# Patient Record
Sex: FEMALE | Race: WHITE | NOT HISPANIC OR LATINO | Employment: OTHER | URBAN - METROPOLITAN AREA
[De-identification: names, ages, dates, MRNs, and addresses within clinical notes are randomized per-mention and may not be internally consistent; named-entity substitution may affect disease eponyms.]

---

## 2020-07-18 ENCOUNTER — TRANSFERRED RECORDS (OUTPATIENT)
Dept: HEALTH INFORMATION MANAGEMENT | Facility: CLINIC | Age: 80
End: 2020-07-18

## 2020-07-18 LAB — EJECTION FRACTION: 65 %

## 2020-08-06 ENCOUNTER — HOSPITAL ENCOUNTER (OUTPATIENT)
Dept: CARDIAC REHAB | Facility: CLINIC | Age: 80
End: 2020-08-06
Payer: COMMERCIAL

## 2020-08-06 PROCEDURE — 40000575 ZZH STATISTIC OP CARDIAC VISIT #2: Performed by: REHABILITATION PRACTITIONER

## 2020-08-06 PROCEDURE — 93797 PHYS/QHP OP CAR RHAB WO ECG: CPT | Mod: 59 | Performed by: REHABILITATION PRACTITIONER

## 2020-08-06 PROCEDURE — 93798 PHYS/QHP OP CAR RHAB W/ECG: CPT | Performed by: REHABILITATION PRACTITIONER

## 2020-08-06 PROCEDURE — 40000116 ZZH STATISTIC OP CR VISIT: Performed by: REHABILITATION PRACTITIONER

## 2020-08-13 ENCOUNTER — HOSPITAL ENCOUNTER (OUTPATIENT)
Dept: CARDIAC REHAB | Facility: CLINIC | Age: 80
End: 2020-08-13
Attending: INTERNAL MEDICINE
Payer: COMMERCIAL

## 2020-08-13 PROCEDURE — 93798 PHYS/QHP OP CAR RHAB W/ECG: CPT

## 2020-08-13 PROCEDURE — 40000116 ZZH STATISTIC OP CR VISIT

## 2020-08-18 ENCOUNTER — HOSPITAL ENCOUNTER (OUTPATIENT)
Dept: CARDIAC REHAB | Facility: CLINIC | Age: 80
End: 2020-08-18
Attending: INTERNAL MEDICINE
Payer: COMMERCIAL

## 2020-08-18 PROCEDURE — 93798 PHYS/QHP OP CAR RHAB W/ECG: CPT

## 2020-08-18 PROCEDURE — 40000116 ZZH STATISTIC OP CR VISIT

## 2020-08-20 ENCOUNTER — HOSPITAL ENCOUNTER (OUTPATIENT)
Dept: CARDIAC REHAB | Facility: CLINIC | Age: 80
End: 2020-08-20
Attending: INTERNAL MEDICINE
Payer: COMMERCIAL

## 2020-08-20 PROCEDURE — 93798 PHYS/QHP OP CAR RHAB W/ECG: CPT

## 2020-08-20 PROCEDURE — 40000116 ZZH STATISTIC OP CR VISIT

## 2020-08-25 ENCOUNTER — HOSPITAL ENCOUNTER (OUTPATIENT)
Dept: CARDIAC REHAB | Facility: CLINIC | Age: 80
End: 2020-08-25
Attending: INTERNAL MEDICINE
Payer: COMMERCIAL

## 2020-08-25 PROCEDURE — 93798 PHYS/QHP OP CAR RHAB W/ECG: CPT

## 2020-08-25 PROCEDURE — 40000116 ZZH STATISTIC OP CR VISIT

## 2020-08-27 ENCOUNTER — HOSPITAL ENCOUNTER (OUTPATIENT)
Dept: CARDIAC REHAB | Facility: CLINIC | Age: 80
End: 2020-08-27
Attending: INTERNAL MEDICINE
Payer: COMMERCIAL

## 2020-08-27 PROCEDURE — 93798 PHYS/QHP OP CAR RHAB W/ECG: CPT

## 2020-08-27 PROCEDURE — 40000116 ZZH STATISTIC OP CR VISIT

## 2020-09-01 ENCOUNTER — HOSPITAL ENCOUNTER (OUTPATIENT)
Dept: CARDIAC REHAB | Facility: CLINIC | Age: 80
End: 2020-09-01
Attending: INTERNAL MEDICINE
Payer: COMMERCIAL

## 2020-09-01 PROCEDURE — 40000116 ZZH STATISTIC OP CR VISIT

## 2020-09-01 PROCEDURE — 93798 PHYS/QHP OP CAR RHAB W/ECG: CPT

## 2020-09-02 ENCOUNTER — HOSPITAL ENCOUNTER (OUTPATIENT)
Dept: CARDIAC REHAB | Facility: CLINIC | Age: 80
End: 2020-09-02
Attending: INTERNAL MEDICINE
Payer: COMMERCIAL

## 2020-09-02 PROCEDURE — 40000575 ZZH STATISTIC OP CARDIAC VISIT #2

## 2020-09-02 PROCEDURE — 93798 PHYS/QHP OP CAR RHAB W/ECG: CPT

## 2020-09-02 PROCEDURE — 93797 PHYS/QHP OP CAR RHAB WO ECG: CPT | Mod: 59

## 2020-09-02 PROCEDURE — 40000116 ZZH STATISTIC OP CR VISIT

## 2020-09-04 ENCOUNTER — HOSPITAL ENCOUNTER (OUTPATIENT)
Dept: CARDIAC REHAB | Facility: CLINIC | Age: 80
End: 2020-09-04
Attending: INTERNAL MEDICINE
Payer: COMMERCIAL

## 2020-09-04 PROCEDURE — 93798 PHYS/QHP OP CAR RHAB W/ECG: CPT

## 2020-09-04 PROCEDURE — 40000116 ZZH STATISTIC OP CR VISIT

## 2020-09-08 ENCOUNTER — HOSPITAL ENCOUNTER (OUTPATIENT)
Dept: CARDIAC REHAB | Facility: CLINIC | Age: 80
End: 2020-09-08
Attending: INTERNAL MEDICINE
Payer: COMMERCIAL

## 2020-09-08 PROCEDURE — 40000116 ZZH STATISTIC OP CR VISIT

## 2020-09-08 PROCEDURE — 93798 PHYS/QHP OP CAR RHAB W/ECG: CPT

## 2020-09-10 ENCOUNTER — HOSPITAL ENCOUNTER (OUTPATIENT)
Dept: CARDIAC REHAB | Facility: CLINIC | Age: 80
End: 2020-09-10
Attending: INTERNAL MEDICINE
Payer: COMMERCIAL

## 2020-09-10 PROCEDURE — 40000116 ZZH STATISTIC OP CR VISIT

## 2020-09-10 PROCEDURE — 93798 PHYS/QHP OP CAR RHAB W/ECG: CPT

## 2020-09-14 ENCOUNTER — HOSPITAL ENCOUNTER (OUTPATIENT)
Dept: CARDIAC REHAB | Facility: CLINIC | Age: 80
End: 2020-09-14
Attending: INTERNAL MEDICINE
Payer: COMMERCIAL

## 2020-09-14 PROCEDURE — 93798 PHYS/QHP OP CAR RHAB W/ECG: CPT

## 2020-09-14 PROCEDURE — 40000116 ZZH STATISTIC OP CR VISIT

## 2020-09-25 ENCOUNTER — HOSPITAL ENCOUNTER (OUTPATIENT)
Dept: CARDIAC REHAB | Facility: CLINIC | Age: 80
End: 2020-09-25
Attending: INTERNAL MEDICINE
Payer: COMMERCIAL

## 2020-09-25 PROCEDURE — 40000116 ZZH STATISTIC OP CR VISIT: Performed by: REHABILITATION PRACTITIONER

## 2020-09-25 PROCEDURE — 93798 PHYS/QHP OP CAR RHAB W/ECG: CPT | Performed by: REHABILITATION PRACTITIONER

## 2020-09-28 ENCOUNTER — HOSPITAL ENCOUNTER (OUTPATIENT)
Dept: CARDIAC REHAB | Facility: CLINIC | Age: 80
End: 2020-09-28
Attending: INTERNAL MEDICINE
Payer: COMMERCIAL

## 2020-09-28 PROCEDURE — 40000116 ZZH STATISTIC OP CR VISIT

## 2020-09-28 PROCEDURE — 93798 PHYS/QHP OP CAR RHAB W/ECG: CPT

## 2020-09-30 ENCOUNTER — HOSPITAL ENCOUNTER (OUTPATIENT)
Dept: CARDIAC REHAB | Facility: CLINIC | Age: 80
End: 2020-09-30
Attending: INTERNAL MEDICINE
Payer: COMMERCIAL

## 2020-09-30 PROCEDURE — 40000116 ZZH STATISTIC OP CR VISIT

## 2020-09-30 PROCEDURE — 93798 PHYS/QHP OP CAR RHAB W/ECG: CPT

## 2020-10-02 ENCOUNTER — HOSPITAL ENCOUNTER (OUTPATIENT)
Dept: CARDIAC REHAB | Facility: CLINIC | Age: 80
End: 2020-10-02
Attending: INTERNAL MEDICINE
Payer: COMMERCIAL

## 2020-10-02 PROCEDURE — 999N000109 HC STATISTIC OP CR VISIT: Performed by: REHABILITATION PRACTITIONER

## 2020-10-02 PROCEDURE — 93798 PHYS/QHP OP CAR RHAB W/ECG: CPT | Performed by: REHABILITATION PRACTITIONER

## 2020-10-05 ENCOUNTER — HOSPITAL ENCOUNTER (OUTPATIENT)
Dept: CARDIAC REHAB | Facility: CLINIC | Age: 80
End: 2020-10-05
Attending: INTERNAL MEDICINE
Payer: COMMERCIAL

## 2020-10-05 PROCEDURE — 93798 PHYS/QHP OP CAR RHAB W/ECG: CPT

## 2020-10-05 PROCEDURE — 999N000109 HC STATISTIC OP CR VISIT

## 2020-10-08 ENCOUNTER — HOSPITAL ENCOUNTER (OUTPATIENT)
Dept: CARDIAC REHAB | Facility: CLINIC | Age: 80
End: 2020-10-08
Attending: INTERNAL MEDICINE
Payer: COMMERCIAL

## 2020-10-08 PROCEDURE — 999N000109 HC STATISTIC OP CR VISIT: Performed by: REHABILITATION PRACTITIONER

## 2020-10-08 PROCEDURE — 93798 PHYS/QHP OP CAR RHAB W/ECG: CPT | Performed by: REHABILITATION PRACTITIONER

## 2020-10-14 ENCOUNTER — HOSPITAL ENCOUNTER (OUTPATIENT)
Dept: CARDIAC REHAB | Facility: CLINIC | Age: 80
End: 2020-10-14
Attending: INTERNAL MEDICINE
Payer: COMMERCIAL

## 2020-10-14 PROCEDURE — 93798 PHYS/QHP OP CAR RHAB W/ECG: CPT

## 2020-10-14 PROCEDURE — 93797 PHYS/QHP OP CAR RHAB WO ECG: CPT

## 2020-10-14 PROCEDURE — 999N000109 HC STATISTIC OP CR VISIT

## 2020-10-14 PROCEDURE — 999N000108 HC STATISTIC OP CARDIAC VISIT #2

## 2021-03-20 ENCOUNTER — HEALTH MAINTENANCE LETTER (OUTPATIENT)
Age: 81
End: 2021-03-20

## 2021-09-04 ENCOUNTER — HEALTH MAINTENANCE LETTER (OUTPATIENT)
Age: 81
End: 2021-09-04

## 2021-11-13 ENCOUNTER — APPOINTMENT (OUTPATIENT)
Dept: GENERAL RADIOLOGY | Facility: CLINIC | Age: 81
DRG: 522 | End: 2021-11-13
Attending: EMERGENCY MEDICINE
Payer: COMMERCIAL

## 2021-11-13 ENCOUNTER — HOSPITAL ENCOUNTER (INPATIENT)
Facility: CLINIC | Age: 81
LOS: 3 days | Discharge: HOME HEALTH | DRG: 522 | End: 2021-11-16
Attending: EMERGENCY MEDICINE | Admitting: INTERNAL MEDICINE
Payer: COMMERCIAL

## 2021-11-13 DIAGNOSIS — S72.001A CLOSED DISPLACED FRACTURE OF RIGHT FEMORAL NECK (H): ICD-10-CM

## 2021-11-13 PROBLEM — E11.9 DM2 (DIABETES MELLITUS, TYPE 2) (H): Status: ACTIVE | Noted: 2020-07-18

## 2021-11-13 PROBLEM — E87.6 HYPOKALEMIA: Status: ACTIVE | Noted: 2020-07-18

## 2021-11-13 PROBLEM — E78.5 HLD (HYPERLIPIDEMIA): Status: ACTIVE | Noted: 2020-07-18

## 2021-11-13 PROBLEM — I25.10 ASCVD (ARTERIOSCLEROTIC CARDIOVASCULAR DISEASE): Status: ACTIVE | Noted: 2020-07-29

## 2021-11-13 PROBLEM — I10 HTN (HYPERTENSION): Status: ACTIVE | Noted: 2020-07-18

## 2021-11-13 PROBLEM — I21.4 NSTEMI (NON-ST ELEVATED MYOCARDIAL INFARCTION) (H): Status: ACTIVE | Noted: 2020-07-18

## 2021-11-13 LAB
ANION GAP SERPL CALCULATED.3IONS-SCNC: 7 MMOL/L (ref 3–14)
BASOPHILS # BLD AUTO: 0.1 10E3/UL (ref 0–0.2)
BASOPHILS NFR BLD AUTO: 0 %
BUN SERPL-MCNC: 16 MG/DL (ref 7–30)
CALCIUM SERPL-MCNC: 8.8 MG/DL (ref 8.5–10.1)
CHLORIDE BLD-SCNC: 106 MMOL/L (ref 94–109)
CO2 SERPL-SCNC: 28 MMOL/L (ref 20–32)
CREAT SERPL-MCNC: 0.64 MG/DL (ref 0.52–1.04)
EOSINOPHIL # BLD AUTO: 0.1 10E3/UL (ref 0–0.7)
EOSINOPHIL NFR BLD AUTO: 0 %
ERYTHROCYTE [DISTWIDTH] IN BLOOD BY AUTOMATED COUNT: 12.5 % (ref 10–15)
GFR SERPL CREATININE-BSD FRML MDRD: 84 ML/MIN/1.73M2
GLUCOSE BLD-MCNC: 178 MG/DL (ref 70–99)
GLUCOSE BLDC GLUCOMTR-MCNC: 185 MG/DL (ref 70–99)
HCT VFR BLD AUTO: 42.4 % (ref 35–47)
HGB BLD-MCNC: 13.8 G/DL (ref 11.7–15.7)
IMM GRANULOCYTES # BLD: 0.2 10E3/UL
IMM GRANULOCYTES NFR BLD: 1 %
INR PPP: 0.92 (ref 0.85–1.15)
LYMPHOCYTES # BLD AUTO: 1.2 10E3/UL (ref 0.8–5.3)
LYMPHOCYTES NFR BLD AUTO: 7 %
MAGNESIUM SERPL-MCNC: 1.9 MG/DL (ref 1.6–2.3)
MCH RBC QN AUTO: 31.2 PG (ref 26.5–33)
MCHC RBC AUTO-ENTMCNC: 32.5 G/DL (ref 31.5–36.5)
MCV RBC AUTO: 96 FL (ref 78–100)
MONOCYTES # BLD AUTO: 0.6 10E3/UL (ref 0–1.3)
MONOCYTES NFR BLD AUTO: 4 %
NEUTROPHILS # BLD AUTO: 13.6 10E3/UL (ref 1.6–8.3)
NEUTROPHILS NFR BLD AUTO: 88 %
NRBC # BLD AUTO: 0 10E3/UL
NRBC BLD AUTO-RTO: 0 /100
PLATELET # BLD AUTO: 243 10E3/UL (ref 150–450)
POTASSIUM BLD-SCNC: 3.3 MMOL/L (ref 3.4–5.3)
RBC # BLD AUTO: 4.43 10E6/UL (ref 3.8–5.2)
SARS-COV-2 RNA RESP QL NAA+PROBE: NEGATIVE
SODIUM SERPL-SCNC: 141 MMOL/L (ref 133–144)
WBC # BLD AUTO: 15.7 10E3/UL (ref 4–11)

## 2021-11-13 PROCEDURE — 99285 EMERGENCY DEPT VISIT HI MDM: CPT | Mod: 25

## 2021-11-13 PROCEDURE — 83036 HEMOGLOBIN GLYCOSYLATED A1C: CPT | Performed by: INTERNAL MEDICINE

## 2021-11-13 PROCEDURE — 83735 ASSAY OF MAGNESIUM: CPT | Performed by: INTERNAL MEDICINE

## 2021-11-13 PROCEDURE — 71045 X-RAY EXAM CHEST 1 VIEW: CPT

## 2021-11-13 PROCEDURE — 87635 SARS-COV-2 COVID-19 AMP PRB: CPT | Performed by: EMERGENCY MEDICINE

## 2021-11-13 PROCEDURE — 36415 COLL VENOUS BLD VENIPUNCTURE: CPT | Performed by: EMERGENCY MEDICINE

## 2021-11-13 PROCEDURE — 99223 1ST HOSP IP/OBS HIGH 75: CPT | Mod: AI | Performed by: INTERNAL MEDICINE

## 2021-11-13 PROCEDURE — 93005 ELECTROCARDIOGRAM TRACING: CPT

## 2021-11-13 PROCEDURE — 85004 AUTOMATED DIFF WBC COUNT: CPT | Performed by: EMERGENCY MEDICINE

## 2021-11-13 PROCEDURE — 85610 PROTHROMBIN TIME: CPT | Performed by: EMERGENCY MEDICINE

## 2021-11-13 PROCEDURE — C9803 HOPD COVID-19 SPEC COLLECT: HCPCS

## 2021-11-13 PROCEDURE — 73502 X-RAY EXAM HIP UNI 2-3 VIEWS: CPT

## 2021-11-13 PROCEDURE — 80048 BASIC METABOLIC PNL TOTAL CA: CPT | Performed by: EMERGENCY MEDICINE

## 2021-11-13 PROCEDURE — 250N000013 HC RX MED GY IP 250 OP 250 PS 637: Performed by: EMERGENCY MEDICINE

## 2021-11-13 PROCEDURE — 120N000001 HC R&B MED SURG/OB

## 2021-11-13 RX ORDER — AMOXICILLIN 250 MG
1 CAPSULE ORAL 2 TIMES DAILY PRN
Status: DISCONTINUED | OUTPATIENT
Start: 2021-11-13 | End: 2021-11-16 | Stop reason: HOSPADM

## 2021-11-13 RX ORDER — POTASSIUM CHLORIDE 1.5 G/1.58G
20 POWDER, FOR SOLUTION ORAL ONCE
Status: COMPLETED | OUTPATIENT
Start: 2021-11-13 | End: 2021-11-13

## 2021-11-13 RX ORDER — NALOXONE HYDROCHLORIDE 0.4 MG/ML
0.2 INJECTION, SOLUTION INTRAMUSCULAR; INTRAVENOUS; SUBCUTANEOUS
Status: DISCONTINUED | OUTPATIENT
Start: 2021-11-13 | End: 2021-11-16 | Stop reason: HOSPADM

## 2021-11-13 RX ORDER — PROCHLORPERAZINE 25 MG
12.5 SUPPOSITORY, RECTAL RECTAL EVERY 12 HOURS PRN
Status: DISCONTINUED | OUTPATIENT
Start: 2021-11-13 | End: 2021-11-16 | Stop reason: HOSPADM

## 2021-11-13 RX ORDER — ATORVASTATIN CALCIUM 40 MG/1
40 TABLET, FILM COATED ORAL DAILY
COMMUNITY

## 2021-11-13 RX ORDER — HYDROMORPHONE HCL IN WATER/PF 6 MG/30 ML
0.2 PATIENT CONTROLLED ANALGESIA SYRINGE INTRAVENOUS
Status: DISCONTINUED | OUTPATIENT
Start: 2021-11-13 | End: 2021-11-16 | Stop reason: HOSPADM

## 2021-11-13 RX ORDER — ONDANSETRON 4 MG/1
4 TABLET, ORALLY DISINTEGRATING ORAL EVERY 6 HOURS PRN
Status: DISCONTINUED | OUTPATIENT
Start: 2021-11-13 | End: 2021-11-16 | Stop reason: HOSPADM

## 2021-11-13 RX ORDER — AMOXICILLIN 250 MG
2 CAPSULE ORAL 2 TIMES DAILY PRN
Status: DISCONTINUED | OUTPATIENT
Start: 2021-11-13 | End: 2021-11-16 | Stop reason: HOSPADM

## 2021-11-13 RX ORDER — CHLORTHALIDONE 25 MG/1
12.5 TABLET ORAL DAILY
COMMUNITY

## 2021-11-13 RX ORDER — ACETAMINOPHEN 325 MG/1
650 TABLET ORAL EVERY 6 HOURS PRN
Status: DISCONTINUED | OUTPATIENT
Start: 2021-11-13 | End: 2021-11-16 | Stop reason: HOSPADM

## 2021-11-13 RX ORDER — NALOXONE HYDROCHLORIDE 0.4 MG/ML
0.4 INJECTION, SOLUTION INTRAMUSCULAR; INTRAVENOUS; SUBCUTANEOUS
Status: DISCONTINUED | OUTPATIENT
Start: 2021-11-13 | End: 2021-11-16 | Stop reason: HOSPADM

## 2021-11-13 RX ORDER — ACETAMINOPHEN 650 MG/1
650 SUPPOSITORY RECTAL EVERY 6 HOURS PRN
Status: DISCONTINUED | OUTPATIENT
Start: 2021-11-13 | End: 2021-11-16 | Stop reason: HOSPADM

## 2021-11-13 RX ORDER — DEXTROSE MONOHYDRATE 25 G/50ML
25-50 INJECTION, SOLUTION INTRAVENOUS
Status: DISCONTINUED | OUTPATIENT
Start: 2021-11-13 | End: 2021-11-16 | Stop reason: HOSPADM

## 2021-11-13 RX ORDER — ONDANSETRON 2 MG/ML
4 INJECTION INTRAMUSCULAR; INTRAVENOUS EVERY 6 HOURS PRN
Status: DISCONTINUED | OUTPATIENT
Start: 2021-11-13 | End: 2021-11-16 | Stop reason: HOSPADM

## 2021-11-13 RX ORDER — LIDOCAINE 40 MG/G
CREAM TOPICAL
Status: DISCONTINUED | OUTPATIENT
Start: 2021-11-13 | End: 2021-11-15

## 2021-11-13 RX ORDER — NICOTINE POLACRILEX 4 MG
15-30 LOZENGE BUCCAL
Status: DISCONTINUED | OUTPATIENT
Start: 2021-11-13 | End: 2021-11-16 | Stop reason: HOSPADM

## 2021-11-13 RX ORDER — OXYCODONE HYDROCHLORIDE 5 MG/1
5 TABLET ORAL EVERY 4 HOURS PRN
Status: DISCONTINUED | OUTPATIENT
Start: 2021-11-13 | End: 2021-11-16 | Stop reason: HOSPADM

## 2021-11-13 RX ORDER — NITROGLYCERIN 0.4 MG/1
0.4 TABLET SUBLINGUAL EVERY 5 MIN PRN
COMMUNITY

## 2021-11-13 RX ORDER — PROCHLORPERAZINE MALEATE 5 MG
5 TABLET ORAL EVERY 6 HOURS PRN
Status: DISCONTINUED | OUTPATIENT
Start: 2021-11-13 | End: 2021-11-16 | Stop reason: HOSPADM

## 2021-11-13 RX ORDER — ATENOLOL 50 MG/1
50 TABLET ORAL DAILY
COMMUNITY

## 2021-11-13 RX ADMIN — POTASSIUM CHLORIDE 20 MEQ: 1.5 POWDER, FOR SOLUTION ORAL at 21:31

## 2021-11-13 ASSESSMENT — ACTIVITIES OF DAILY LIVING (ADL)
ADLS_ACUITY_SCORE: 3
ADLS_ACUITY_SCORE: 3

## 2021-11-13 ASSESSMENT — ENCOUNTER SYMPTOMS
ARTHRALGIAS: 1
BACK PAIN: 0
SHORTNESS OF BREATH: 0
NUMBNESS: 0
ABDOMINAL PAIN: 0

## 2021-11-14 ENCOUNTER — ANESTHESIA (OUTPATIENT)
Dept: SURGERY | Facility: CLINIC | Age: 81
DRG: 522 | End: 2021-11-14
Payer: COMMERCIAL

## 2021-11-14 ENCOUNTER — APPOINTMENT (OUTPATIENT)
Dept: GENERAL RADIOLOGY | Facility: CLINIC | Age: 81
DRG: 522 | End: 2021-11-14
Attending: PHYSICIAN ASSISTANT
Payer: COMMERCIAL

## 2021-11-14 ENCOUNTER — ANESTHESIA EVENT (OUTPATIENT)
Dept: SURGERY | Facility: CLINIC | Age: 81
DRG: 522 | End: 2021-11-14
Payer: COMMERCIAL

## 2021-11-14 LAB
ANION GAP SERPL CALCULATED.3IONS-SCNC: 5 MMOL/L (ref 3–14)
BASOPHILS # BLD AUTO: 0 10E3/UL (ref 0–0.2)
BASOPHILS NFR BLD AUTO: 0 %
BUN SERPL-MCNC: 15 MG/DL (ref 7–30)
CALCIUM SERPL-MCNC: 8.8 MG/DL (ref 8.5–10.1)
CHLORIDE BLD-SCNC: 105 MMOL/L (ref 94–109)
CO2 SERPL-SCNC: 28 MMOL/L (ref 20–32)
CREAT SERPL-MCNC: 0.61 MG/DL (ref 0.52–1.04)
EOSINOPHIL # BLD AUTO: 0 10E3/UL (ref 0–0.7)
EOSINOPHIL NFR BLD AUTO: 0 %
ERYTHROCYTE [DISTWIDTH] IN BLOOD BY AUTOMATED COUNT: 12.8 % (ref 10–15)
GFR SERPL CREATININE-BSD FRML MDRD: 85 ML/MIN/1.73M2
GLUCOSE BLD-MCNC: 177 MG/DL (ref 70–99)
GLUCOSE BLDC GLUCOMTR-MCNC: 156 MG/DL (ref 70–99)
GLUCOSE BLDC GLUCOMTR-MCNC: 162 MG/DL (ref 70–99)
GLUCOSE BLDC GLUCOMTR-MCNC: 208 MG/DL (ref 70–99)
GLUCOSE BLDC GLUCOMTR-MCNC: 227 MG/DL (ref 70–99)
GLUCOSE BLDC GLUCOMTR-MCNC: 242 MG/DL (ref 70–99)
HBA1C MFR BLD: 6.8 % (ref 0–5.6)
HCT VFR BLD AUTO: 41.3 % (ref 35–47)
HGB BLD-MCNC: 13.4 G/DL (ref 11.7–15.7)
IMM GRANULOCYTES # BLD: 0.1 10E3/UL
IMM GRANULOCYTES NFR BLD: 1 %
LYMPHOCYTES # BLD AUTO: 1.2 10E3/UL (ref 0.8–5.3)
LYMPHOCYTES NFR BLD AUTO: 13 %
MCH RBC QN AUTO: 31 PG (ref 26.5–33)
MCHC RBC AUTO-ENTMCNC: 32.4 G/DL (ref 31.5–36.5)
MCV RBC AUTO: 96 FL (ref 78–100)
MONOCYTES # BLD AUTO: 0.6 10E3/UL (ref 0–1.3)
MONOCYTES NFR BLD AUTO: 6 %
NEUTROPHILS # BLD AUTO: 7.4 10E3/UL (ref 1.6–8.3)
NEUTROPHILS NFR BLD AUTO: 80 %
NRBC # BLD AUTO: 0 10E3/UL
NRBC BLD AUTO-RTO: 0 /100
PLATELET # BLD AUTO: 230 10E3/UL (ref 150–450)
POTASSIUM BLD-SCNC: 3.2 MMOL/L (ref 3.4–5.3)
POTASSIUM BLD-SCNC: 3.5 MMOL/L (ref 3.4–5.3)
POTASSIUM BLD-SCNC: 4.3 MMOL/L (ref 3.4–5.3)
RBC # BLD AUTO: 4.32 10E6/UL (ref 3.8–5.2)
SODIUM SERPL-SCNC: 138 MMOL/L (ref 133–144)
WBC # BLD AUTO: 9.4 10E3/UL (ref 4–11)

## 2021-11-14 PROCEDURE — 258N000003 HC RX IP 258 OP 636: Performed by: INTERNAL MEDICINE

## 2021-11-14 PROCEDURE — C1776 JOINT DEVICE (IMPLANTABLE): HCPCS | Performed by: ORTHOPAEDIC SURGERY

## 2021-11-14 PROCEDURE — 272N000001 HC OR GENERAL SUPPLY STERILE: Performed by: ORTHOPAEDIC SURGERY

## 2021-11-14 PROCEDURE — 250N000011 HC RX IP 250 OP 636: Performed by: PHYSICIAN ASSISTANT

## 2021-11-14 PROCEDURE — 36415 COLL VENOUS BLD VENIPUNCTURE: CPT | Performed by: ANESTHESIOLOGY

## 2021-11-14 PROCEDURE — 88311 DECALCIFY TISSUE: CPT | Mod: TC | Performed by: ORTHOPAEDIC SURGERY

## 2021-11-14 PROCEDURE — 0SR904A REPLACEMENT OF RIGHT HIP JOINT WITH CERAMIC ON POLYETHYLENE SYNTHETIC SUBSTITUTE, UNCEMENTED, OPEN APPROACH: ICD-10-PCS | Performed by: ORTHOPAEDIC SURGERY

## 2021-11-14 PROCEDURE — 250N000011 HC RX IP 250 OP 636: Performed by: NURSE ANESTHETIST, CERTIFIED REGISTERED

## 2021-11-14 PROCEDURE — 80048 BASIC METABOLIC PNL TOTAL CA: CPT | Performed by: INTERNAL MEDICINE

## 2021-11-14 PROCEDURE — 84132 ASSAY OF SERUM POTASSIUM: CPT | Performed by: INTERNAL MEDICINE

## 2021-11-14 PROCEDURE — C1713 ANCHOR/SCREW BN/BN,TIS/BN: HCPCS | Performed by: ORTHOPAEDIC SURGERY

## 2021-11-14 PROCEDURE — 250N000009 HC RX 250: Performed by: ANESTHESIOLOGY

## 2021-11-14 PROCEDURE — 999N000141 HC STATISTIC PRE-PROCEDURE NURSING ASSESSMENT: Performed by: ORTHOPAEDIC SURGERY

## 2021-11-14 PROCEDURE — 258N000001 HC RX 258: Performed by: ORTHOPAEDIC SURGERY

## 2021-11-14 PROCEDURE — 250N000011 HC RX IP 250 OP 636: Performed by: INTERNAL MEDICINE

## 2021-11-14 PROCEDURE — 250N000011 HC RX IP 250 OP 636: Performed by: ORTHOPAEDIC SURGERY

## 2021-11-14 PROCEDURE — 250N000009 HC RX 250: Performed by: NURSE ANESTHETIST, CERTIFIED REGISTERED

## 2021-11-14 PROCEDURE — 999N000065 XR PELVIS AD HIP PORTABLE RIGHT 1 VIEW

## 2021-11-14 PROCEDURE — 250N000013 HC RX MED GY IP 250 OP 250 PS 637: Performed by: INTERNAL MEDICINE

## 2021-11-14 PROCEDURE — 250N000009 HC RX 250: Performed by: ORTHOPAEDIC SURGERY

## 2021-11-14 PROCEDURE — 250N000012 HC RX MED GY IP 250 OP 636 PS 637: Performed by: INTERNAL MEDICINE

## 2021-11-14 PROCEDURE — 710N000010 HC RECOVERY PHASE 1, LEVEL 2, PER MIN: Performed by: ORTHOPAEDIC SURGERY

## 2021-11-14 PROCEDURE — 370N000017 HC ANESTHESIA TECHNICAL FEE, PER MIN: Performed by: ORTHOPAEDIC SURGERY

## 2021-11-14 PROCEDURE — 36415 COLL VENOUS BLD VENIPUNCTURE: CPT | Performed by: INTERNAL MEDICINE

## 2021-11-14 PROCEDURE — 360N000077 HC SURGERY LEVEL 4, PER MIN: Performed by: ORTHOPAEDIC SURGERY

## 2021-11-14 PROCEDURE — 84132 ASSAY OF SERUM POTASSIUM: CPT | Performed by: ANESTHESIOLOGY

## 2021-11-14 PROCEDURE — 250N000011 HC RX IP 250 OP 636: Performed by: ANESTHESIOLOGY

## 2021-11-14 PROCEDURE — 120N000001 HC R&B MED SURG/OB

## 2021-11-14 PROCEDURE — 258N000003 HC RX IP 258 OP 636: Performed by: PHYSICIAN ASSISTANT

## 2021-11-14 PROCEDURE — 85048 AUTOMATED LEUKOCYTE COUNT: CPT | Performed by: INTERNAL MEDICINE

## 2021-11-14 PROCEDURE — 258N000003 HC RX IP 258 OP 636: Performed by: NURSE ANESTHETIST, CERTIFIED REGISTERED

## 2021-11-14 PROCEDURE — 99232 SBSQ HOSP IP/OBS MODERATE 35: CPT | Performed by: INTERNAL MEDICINE

## 2021-11-14 DEVICE — IMP STEM FEMORAL HIP STRK ACCOLADE II 132DEG SZ 6 6720-0635: Type: IMPLANTABLE DEVICE | Site: HIP | Status: FUNCTIONAL

## 2021-11-14 DEVICE — IMP SCR STRK LOW PROFILE HEX 6.5X30MM 7030-6530: Type: IMPLANTABLE DEVICE | Site: HIP | Status: FUNCTIONAL

## 2021-11-14 DEVICE — IMP HEAD FEMORAL STRK BIOLOX DELTA CERAMIC V40 36MM: Type: IMPLANTABLE DEVICE | Site: HIP | Status: FUNCTIONAL

## 2021-11-14 DEVICE — 6.5MM LOW PROFILE HEX SCR 20MM: Type: IMPLANTABLE DEVICE | Site: HIP | Status: FUNCTIONAL

## 2021-11-14 DEVICE — IMPLANTABLE DEVICE: Type: IMPLANTABLE DEVICE | Site: HIP | Status: FUNCTIONAL

## 2021-11-14 RX ORDER — DIPHENHYDRAMINE HYDROCHLORIDE 50 MG/ML
12.5 INJECTION INTRAMUSCULAR; INTRAVENOUS EVERY 6 HOURS PRN
Status: DISCONTINUED | OUTPATIENT
Start: 2021-11-14 | End: 2021-11-14 | Stop reason: HOSPADM

## 2021-11-14 RX ORDER — LIDOCAINE HYDROCHLORIDE 10 MG/ML
INJECTION, SOLUTION INFILTRATION; PERINEURAL PRN
Status: DISCONTINUED | OUTPATIENT
Start: 2021-11-14 | End: 2021-11-14

## 2021-11-14 RX ORDER — ONDANSETRON 4 MG/1
4 TABLET, ORALLY DISINTEGRATING ORAL EVERY 30 MIN PRN
Status: DISCONTINUED | OUTPATIENT
Start: 2021-11-14 | End: 2021-11-14 | Stop reason: HOSPADM

## 2021-11-14 RX ORDER — LISINOPRIL 10 MG/1
10 TABLET ORAL DAILY
Status: DISCONTINUED | OUTPATIENT
Start: 2021-11-14 | End: 2021-11-16 | Stop reason: HOSPADM

## 2021-11-14 RX ORDER — TRANEXAMIC ACID 10 MG/ML
1 INJECTION, SOLUTION INTRAVENOUS ONCE
Status: COMPLETED | OUTPATIENT
Start: 2021-11-14 | End: 2021-11-14

## 2021-11-14 RX ORDER — TRANEXAMIC ACID 10 MG/ML
1 INJECTION, SOLUTION INTRAVENOUS ONCE
Status: DISCONTINUED | OUTPATIENT
Start: 2021-11-14 | End: 2021-11-14

## 2021-11-14 RX ORDER — SCOLOPAMINE TRANSDERMAL SYSTEM 1 MG/1
1 PATCH, EXTENDED RELEASE TRANSDERMAL ONCE
Status: DISCONTINUED | OUTPATIENT
Start: 2021-11-14 | End: 2021-11-16 | Stop reason: HOSPADM

## 2021-11-14 RX ORDER — ACETAMINOPHEN 325 MG/1
975 TABLET ORAL ONCE
Status: DISCONTINUED | OUTPATIENT
Start: 2021-11-14 | End: 2021-11-14 | Stop reason: HOSPADM

## 2021-11-14 RX ORDER — PHENYLEPHRINE HYDROCHLORIDE 10 MG/ML
INJECTION INTRAVENOUS PRN
Status: DISCONTINUED | OUTPATIENT
Start: 2021-11-14 | End: 2021-11-14

## 2021-11-14 RX ORDER — LIDOCAINE 40 MG/G
CREAM TOPICAL
Status: DISCONTINUED | OUTPATIENT
Start: 2021-11-14 | End: 2021-11-14 | Stop reason: HOSPADM

## 2021-11-14 RX ORDER — ATORVASTATIN CALCIUM 40 MG/1
40 TABLET, FILM COATED ORAL DAILY
Status: DISCONTINUED | OUTPATIENT
Start: 2021-11-14 | End: 2021-11-16 | Stop reason: HOSPADM

## 2021-11-14 RX ORDER — HYDRALAZINE HYDROCHLORIDE 20 MG/ML
2.5-5 INJECTION INTRAMUSCULAR; INTRAVENOUS EVERY 10 MIN PRN
Status: DISCONTINUED | OUTPATIENT
Start: 2021-11-14 | End: 2021-11-14 | Stop reason: HOSPADM

## 2021-11-14 RX ORDER — FENTANYL CITRATE 50 UG/ML
25 INJECTION, SOLUTION INTRAMUSCULAR; INTRAVENOUS EVERY 5 MIN PRN
Status: DISCONTINUED | OUTPATIENT
Start: 2021-11-14 | End: 2021-11-14 | Stop reason: HOSPADM

## 2021-11-14 RX ORDER — OXYCODONE HYDROCHLORIDE 5 MG/1
5 TABLET ORAL EVERY 4 HOURS PRN
Status: DISCONTINUED | OUTPATIENT
Start: 2021-11-14 | End: 2021-11-14 | Stop reason: HOSPADM

## 2021-11-14 RX ORDER — SODIUM CHLORIDE, SODIUM LACTATE, POTASSIUM CHLORIDE, CALCIUM CHLORIDE 600; 310; 30; 20 MG/100ML; MG/100ML; MG/100ML; MG/100ML
INJECTION, SOLUTION INTRAVENOUS CONTINUOUS
Status: DISCONTINUED | OUTPATIENT
Start: 2021-11-14 | End: 2021-11-14 | Stop reason: HOSPADM

## 2021-11-14 RX ORDER — HALOPERIDOL 5 MG/ML
1 INJECTION INTRAMUSCULAR
Status: DISCONTINUED | OUTPATIENT
Start: 2021-11-14 | End: 2021-11-14 | Stop reason: HOSPADM

## 2021-11-14 RX ORDER — HYDROMORPHONE HCL IN WATER/PF 6 MG/30 ML
0.2 PATIENT CONTROLLED ANALGESIA SYRINGE INTRAVENOUS EVERY 5 MIN PRN
Status: DISCONTINUED | OUTPATIENT
Start: 2021-11-14 | End: 2021-11-14 | Stop reason: HOSPADM

## 2021-11-14 RX ORDER — LIDOCAINE 40 MG/G
CREAM TOPICAL
Status: DISCONTINUED | OUTPATIENT
Start: 2021-11-14 | End: 2021-11-14

## 2021-11-14 RX ORDER — VANCOMYCIN HYDROCHLORIDE 1 G/20ML
INJECTION, POWDER, LYOPHILIZED, FOR SOLUTION INTRAVENOUS PRN
Status: DISCONTINUED | OUTPATIENT
Start: 2021-11-14 | End: 2021-11-14 | Stop reason: HOSPADM

## 2021-11-14 RX ORDER — KETOROLAC TROMETHAMINE 30 MG/ML
INJECTION, SOLUTION INTRAMUSCULAR; INTRAVENOUS PRN
Status: DISCONTINUED | OUTPATIENT
Start: 2021-11-14 | End: 2021-11-14

## 2021-11-14 RX ORDER — PROPOFOL 10 MG/ML
INJECTION, EMULSION INTRAVENOUS PRN
Status: DISCONTINUED | OUTPATIENT
Start: 2021-11-14 | End: 2021-11-14

## 2021-11-14 RX ORDER — DIAZEPAM 10 MG/2ML
2.5 INJECTION, SOLUTION INTRAMUSCULAR; INTRAVENOUS
Status: DISCONTINUED | OUTPATIENT
Start: 2021-11-14 | End: 2021-11-14 | Stop reason: HOSPADM

## 2021-11-14 RX ORDER — CEFAZOLIN SODIUM/WATER 2 G/20 ML
2 SYRINGE (ML) INTRAVENOUS SEE ADMIN INSTRUCTIONS
Status: DISCONTINUED | OUTPATIENT
Start: 2021-11-14 | End: 2021-11-14 | Stop reason: HOSPADM

## 2021-11-14 RX ORDER — ONDANSETRON 2 MG/ML
INJECTION INTRAMUSCULAR; INTRAVENOUS PRN
Status: DISCONTINUED | OUTPATIENT
Start: 2021-11-14 | End: 2021-11-14

## 2021-11-14 RX ORDER — POTASSIUM CHLORIDE 7.45 MG/ML
10 INJECTION INTRAVENOUS
Status: COMPLETED | OUTPATIENT
Start: 2021-11-14 | End: 2021-11-14

## 2021-11-14 RX ORDER — LIDOCAINE 40 MG/G
CREAM TOPICAL
Status: DISCONTINUED | OUTPATIENT
Start: 2021-11-14 | End: 2021-11-16 | Stop reason: HOSPADM

## 2021-11-14 RX ORDER — CEFAZOLIN SODIUM/WATER 2 G/20 ML
2 SYRINGE (ML) INTRAVENOUS
Status: DISCONTINUED | OUTPATIENT
Start: 2021-11-14 | End: 2021-11-14 | Stop reason: HOSPADM

## 2021-11-14 RX ORDER — DEXAMETHASONE SODIUM PHOSPHATE 4 MG/ML
INJECTION, SOLUTION INTRA-ARTICULAR; INTRALESIONAL; INTRAMUSCULAR; INTRAVENOUS; SOFT TISSUE PRN
Status: DISCONTINUED | OUTPATIENT
Start: 2021-11-14 | End: 2021-11-14

## 2021-11-14 RX ORDER — DIMENHYDRINATE 50 MG/ML
25 INJECTION, SOLUTION INTRAMUSCULAR; INTRAVENOUS
Status: DISCONTINUED | OUTPATIENT
Start: 2021-11-14 | End: 2021-11-14 | Stop reason: HOSPADM

## 2021-11-14 RX ORDER — CEFAZOLIN SODIUM 1 G/50ML
1 INJECTION, SOLUTION INTRAVENOUS EVERY 8 HOURS
Status: COMPLETED | OUTPATIENT
Start: 2021-11-15 | End: 2021-11-15

## 2021-11-14 RX ORDER — TRANEXAMIC ACID 10 MG/ML
1 INJECTION, SOLUTION INTRAVENOUS ONCE
Status: DISCONTINUED | OUTPATIENT
Start: 2021-11-14 | End: 2021-11-14 | Stop reason: HOSPADM

## 2021-11-14 RX ORDER — FENTANYL CITRATE 50 UG/ML
INJECTION, SOLUTION INTRAMUSCULAR; INTRAVENOUS PRN
Status: DISCONTINUED | OUTPATIENT
Start: 2021-11-14 | End: 2021-11-14

## 2021-11-14 RX ORDER — MEPERIDINE HYDROCHLORIDE 25 MG/ML
12.5 INJECTION INTRAMUSCULAR; INTRAVENOUS; SUBCUTANEOUS EVERY 5 MIN PRN
Status: DISCONTINUED | OUTPATIENT
Start: 2021-11-14 | End: 2021-11-14 | Stop reason: HOSPADM

## 2021-11-14 RX ORDER — ONDANSETRON 2 MG/ML
4 INJECTION INTRAMUSCULAR; INTRAVENOUS EVERY 30 MIN PRN
Status: DISCONTINUED | OUTPATIENT
Start: 2021-11-14 | End: 2021-11-14 | Stop reason: HOSPADM

## 2021-11-14 RX ORDER — NEOSTIGMINE METHYLSULFATE 1 MG/ML
VIAL (ML) INJECTION PRN
Status: DISCONTINUED | OUTPATIENT
Start: 2021-11-14 | End: 2021-11-14

## 2021-11-14 RX ORDER — LABETALOL HYDROCHLORIDE 5 MG/ML
10 INJECTION, SOLUTION INTRAVENOUS
Status: DISCONTINUED | OUTPATIENT
Start: 2021-11-14 | End: 2021-11-14 | Stop reason: HOSPADM

## 2021-11-14 RX ORDER — ALBUTEROL SULFATE 0.83 MG/ML
2.5 SOLUTION RESPIRATORY (INHALATION) EVERY 4 HOURS PRN
Status: DISCONTINUED | OUTPATIENT
Start: 2021-11-14 | End: 2021-11-14 | Stop reason: HOSPADM

## 2021-11-14 RX ORDER — SODIUM CHLORIDE, SODIUM LACTATE, POTASSIUM CHLORIDE, CALCIUM CHLORIDE 600; 310; 30; 20 MG/100ML; MG/100ML; MG/100ML; MG/100ML
INJECTION, SOLUTION INTRAVENOUS CONTINUOUS PRN
Status: DISCONTINUED | OUTPATIENT
Start: 2021-11-14 | End: 2021-11-14

## 2021-11-14 RX ORDER — EPHEDRINE SULFATE 50 MG/ML
INJECTION, SOLUTION INTRAVENOUS PRN
Status: DISCONTINUED | OUTPATIENT
Start: 2021-11-14 | End: 2021-11-14

## 2021-11-14 RX ORDER — LANOLIN ALCOHOL/MO/W.PET/CERES
3 CREAM (GRAM) TOPICAL
Status: DISCONTINUED | OUTPATIENT
Start: 2021-11-14 | End: 2021-11-16 | Stop reason: HOSPADM

## 2021-11-14 RX ORDER — SODIUM CHLORIDE, SODIUM LACTATE, POTASSIUM CHLORIDE, CALCIUM CHLORIDE 600; 310; 30; 20 MG/100ML; MG/100ML; MG/100ML; MG/100ML
INJECTION, SOLUTION INTRAVENOUS CONTINUOUS
Status: DISCONTINUED | OUTPATIENT
Start: 2021-11-14 | End: 2021-11-15

## 2021-11-14 RX ORDER — TRANEXAMIC ACID 650 MG/1
1950 TABLET ORAL ONCE
Status: DISCONTINUED | OUTPATIENT
Start: 2021-11-14 | End: 2021-11-14

## 2021-11-14 RX ORDER — GLYCOPYRROLATE 0.2 MG/ML
INJECTION, SOLUTION INTRAMUSCULAR; INTRAVENOUS PRN
Status: DISCONTINUED | OUTPATIENT
Start: 2021-11-14 | End: 2021-11-14

## 2021-11-14 RX ORDER — ATENOLOL 50 MG/1
50 TABLET ORAL DAILY
Status: DISCONTINUED | OUTPATIENT
Start: 2021-11-14 | End: 2021-11-16 | Stop reason: HOSPADM

## 2021-11-14 RX ORDER — DIPHENHYDRAMINE HCL 12.5MG/5ML
12.5 LIQUID (ML) ORAL EVERY 6 HOURS PRN
Status: DISCONTINUED | OUTPATIENT
Start: 2021-11-14 | End: 2021-11-14 | Stop reason: HOSPADM

## 2021-11-14 RX ADMIN — HYDROMORPHONE HYDROCHLORIDE 1 MG: 1 INJECTION, SOLUTION INTRAMUSCULAR; INTRAVENOUS; SUBCUTANEOUS at 16:30

## 2021-11-14 RX ADMIN — SODIUM CHLORIDE, POTASSIUM CHLORIDE, SODIUM LACTATE AND CALCIUM CHLORIDE: 600; 310; 30; 20 INJECTION, SOLUTION INTRAVENOUS at 21:33

## 2021-11-14 RX ADMIN — SODIUM CHLORIDE, POTASSIUM CHLORIDE, SODIUM LACTATE AND CALCIUM CHLORIDE: 600; 310; 30; 20 INJECTION, SOLUTION INTRAVENOUS at 15:52

## 2021-11-14 RX ADMIN — FENTANYL CITRATE 25 MCG: 50 INJECTION INTRAMUSCULAR; INTRAVENOUS at 19:05

## 2021-11-14 RX ADMIN — NEOSTIGMINE METHYLSULFATE 2 MG: 1 INJECTION, SOLUTION INTRAVENOUS at 18:31

## 2021-11-14 RX ADMIN — HYDROMORPHONE HYDROCHLORIDE 0.2 MG: 0.2 INJECTION, SOLUTION INTRAMUSCULAR; INTRAVENOUS; SUBCUTANEOUS at 09:02

## 2021-11-14 RX ADMIN — ONDANSETRON HYDROCHLORIDE 4 MG: 2 INJECTION, SOLUTION INTRAVENOUS at 16:30

## 2021-11-14 RX ADMIN — GLYCOPYRROLATE 0.2 MG: 0.2 INJECTION, SOLUTION INTRAMUSCULAR; INTRAVENOUS at 16:30

## 2021-11-14 RX ADMIN — DEXAMETHASONE SODIUM PHOSPHATE 4 MG: 4 INJECTION, SOLUTION INTRA-ARTICULAR; INTRALESIONAL; INTRAMUSCULAR; INTRAVENOUS; SOFT TISSUE at 16:30

## 2021-11-14 RX ADMIN — SCOPALAMINE 1 PATCH: 1 PATCH, EXTENDED RELEASE TRANSDERMAL at 16:00

## 2021-11-14 RX ADMIN — FENTANYL CITRATE 25 MCG: 50 INJECTION INTRAMUSCULAR; INTRAVENOUS at 19:30

## 2021-11-14 RX ADMIN — EPHEDRINE SULFATE 5 MG: 50 INJECTION, SOLUTION INTRAVENOUS at 16:43

## 2021-11-14 RX ADMIN — HYDROMORPHONE HYDROCHLORIDE 0.2 MG: 0.2 INJECTION, SOLUTION INTRAMUSCULAR; INTRAVENOUS; SUBCUTANEOUS at 05:59

## 2021-11-14 RX ADMIN — TRANEXAMIC ACID 1 G: 10 INJECTION, SOLUTION INTRAVENOUS at 18:21

## 2021-11-14 RX ADMIN — KETOROLAC TROMETHAMINE 15 MG: 30 INJECTION, SOLUTION INTRAMUSCULAR at 16:30

## 2021-11-14 RX ADMIN — HYDROMORPHONE HYDROCHLORIDE 0.2 MG: 0.2 INJECTION, SOLUTION INTRAMUSCULAR; INTRAVENOUS; SUBCUTANEOUS at 03:17

## 2021-11-14 RX ADMIN — ONDANSETRON 4 MG: 2 INJECTION INTRAMUSCULAR; INTRAVENOUS at 21:42

## 2021-11-14 RX ADMIN — INSULIN ASPART 2 UNITS: 100 INJECTION, SOLUTION INTRAVENOUS; SUBCUTANEOUS at 22:54

## 2021-11-14 RX ADMIN — SODIUM CHLORIDE, POTASSIUM CHLORIDE, SODIUM LACTATE AND CALCIUM CHLORIDE: 600; 310; 30; 20 INJECTION, SOLUTION INTRAVENOUS at 17:58

## 2021-11-14 RX ADMIN — TRANEXAMIC ACID 1 G: 10 INJECTION, SOLUTION INTRAVENOUS at 16:47

## 2021-11-14 RX ADMIN — POTASSIUM CHLORIDE 10 MEQ: 7.46 INJECTION, SOLUTION INTRAVENOUS at 13:31

## 2021-11-14 RX ADMIN — LIDOCAINE HYDROCHLORIDE 30 MG: 10 INJECTION, SOLUTION INFILTRATION; PERINEURAL at 16:30

## 2021-11-14 RX ADMIN — CHLORTHALIDONE 12.5 MG: 25 TABLET ORAL at 10:25

## 2021-11-14 RX ADMIN — EPHEDRINE SULFATE 5 MG: 50 INJECTION, SOLUTION INTRAVENOUS at 16:47

## 2021-11-14 RX ADMIN — PHENYLEPHRINE HYDROCHLORIDE 150 MCG: 10 INJECTION INTRAVENOUS at 16:43

## 2021-11-14 RX ADMIN — ATENOLOL 50 MG: 50 TABLET ORAL at 09:55

## 2021-11-14 RX ADMIN — PROPOFOL 50 MCG/KG/MIN: 10 INJECTION, EMULSION INTRAVENOUS at 16:42

## 2021-11-14 RX ADMIN — HYDROMORPHONE HYDROCHLORIDE 0.2 MG: 0.2 INJECTION, SOLUTION INTRAMUSCULAR; INTRAVENOUS; SUBCUTANEOUS at 13:16

## 2021-11-14 RX ADMIN — CEFAZOLIN SODIUM 1 G: 1 INJECTION, SOLUTION INTRAVENOUS at 23:54

## 2021-11-14 RX ADMIN — Medication 2 G: at 16:29

## 2021-11-14 RX ADMIN — PROPOFOL 120 MG: 10 INJECTION, EMULSION INTRAVENOUS at 16:30

## 2021-11-14 RX ADMIN — FENTANYL CITRATE 50 MCG: 50 INJECTION, SOLUTION INTRAMUSCULAR; INTRAVENOUS at 17:14

## 2021-11-14 RX ADMIN — POTASSIUM CHLORIDE 10 MEQ: 7.46 INJECTION, SOLUTION INTRAVENOUS at 11:25

## 2021-11-14 RX ADMIN — FENTANYL CITRATE 100 MCG: 50 INJECTION, SOLUTION INTRAMUSCULAR; INTRAVENOUS at 16:30

## 2021-11-14 RX ADMIN — POTASSIUM CHLORIDE 10 MEQ: 7.46 INJECTION, SOLUTION INTRAVENOUS at 10:20

## 2021-11-14 RX ADMIN — POTASSIUM CHLORIDE 10 MEQ: 7.46 INJECTION, SOLUTION INTRAVENOUS at 12:27

## 2021-11-14 RX ADMIN — LISINOPRIL 10 MG: 10 TABLET ORAL at 09:55

## 2021-11-14 RX ADMIN — ROCURONIUM BROMIDE 40 MG: 50 INJECTION, SOLUTION INTRAVENOUS at 16:30

## 2021-11-14 RX ADMIN — GLYCOPYRROLATE 0.2 MG: 0.2 INJECTION, SOLUTION INTRAMUSCULAR; INTRAVENOUS at 18:31

## 2021-11-14 ASSESSMENT — ACTIVITIES OF DAILY LIVING (ADL)
ADLS_ACUITY_SCORE: 9

## 2021-11-14 NOTE — PLAN OF CARE
"BP (!) 153/60 (BP Location: Right arm)   Pulse 79   Temp 97.9  F (36.6  C) (Temporal)   Resp 16   Ht 1.575 m (5' 2\")   Wt 73.5 kg (162 lb)   SpO2 94%   BMI 29.63 kg/m      Pt A&Ox4. VSS. Currently in OR. K/mag protocol. K replaced this AM. Recheck came back @ 4.3. WBAT to TAYLOR Cortés in place. Will cont POC.     Gina Meyers RN    "

## 2021-11-14 NOTE — PHARMACY-ADMISSION MEDICATION HISTORY
Admission medication history interview status for this patient is complete. See [facility] admission navigator for allergy information, prior to admission medications and immunization status.     Medication history interview done, indicate source(s): Patient  Medication history resources (including written lists, pill bottles, clinic record): written list  Pharmacy: [facility]    Changes made to PTA medication list:  Added: nitroglycerin, atorvastatin, chlorthalidone, atenolol  Changed: none  Reported as Not Taking: none  Removed: atenolol-chlorthalidone (not taking combo med due to dosing), calcium-vitamin D (not taking)    Actions taken by pharmacist (provider contacted, etc):None     Additional medication history information:None    Medication reconciliation/reorder completed by provider prior to medication history?  N    Prior to Admission medications    Medication Sig Last Dose Taking? Auth Provider   aspirin 81 MG EC tablet [ASPIRIN 81 MG EC TABLET] Take 81 mg by mouth daily. 11/13/2021 at 0730 Yes Provider, Historical   atenolol (TENORMIN) 50 MG tablet Take 50 mg by mouth daily 11/13/2021 at 0730 Yes Reported, Patient   atorvastatin (LIPITOR) 40 MG tablet Take 40 mg by mouth daily 11/12/2021 at 2200 Yes Reported, Patient   chlorthalidone (HYGROTON) 25 MG tablet Take 12.5 mg by mouth daily 11/13/2021 at 0730 Yes Reported, Patient   lisinopril (PRINIVIL,ZESTRIL) 10 MG tablet [LISINOPRIL (PRINIVIL,ZESTRIL) 10 MG TABLET] Take 10 mg by mouth daily. 11/13/2021 at 0730 Yes Provider, Historical   metFORMIN (GLUCOPHAGE) 500 MG tablet [METFORMIN (GLUCOPHAGE) 500 MG TABLET] Take 500 mg by mouth 2 (two) times a day with meals. 11/13/2021 at 0730 Yes Provider, Historical   nitroGLYcerin (NITROSTAT) 0.4 MG sublingual tablet Place 0.4 mg under the tongue every 5 minutes as needed for chest pain For chest pain place 1 tablet under the tongue every 5 minutes for 3 doses. If symptoms persist 5 minutes after 1st dose call 911.  Unknown Yes Reported, Patient

## 2021-11-14 NOTE — ED PROVIDER NOTES
History   Chief Complaint:  Fall     The history is provided by the patient.      Terra Bolanos is a 81 year old female with history of ASCVD, diabetes, hypertension, and osteopenia who presents after a fall. The patient reports that she was walking to answer the phone just prior to arrival when she stubbed her toe. This caused her to fall onto the right side of her body, landing in a sitting position on her right hip. She did not hit her head or lose consciousness. She was able to lift herself up onto a chair and tried to walk using her cane, but she had difficulties and had to lower herself down onto the ground. She currently has discomfort in her right hip but denies any overt pain. This discomfort increases with movement of the right leg. She does not have any pain in her right knee or numbness in her right foot. She denies any back pain, rib pain, chest pain, abdominal pain, or shortness of breath. She stopped taking blood thinners over the summer.  She denies any other symptoms.    Review of Systems   Respiratory: Negative for shortness of breath.    Cardiovascular: Negative for chest pain.   Gastrointestinal: Negative for abdominal pain.   Musculoskeletal: Positive for arthralgias (right hip). Negative for back pain.   Neurological: Negative for syncope and numbness.   All other systems reviewed and are negative.      Allergies:  Atorvastatin    Medications:  Tenormin  Lipitor  Hygroton  Nitrostat  Aspirin  Lisinopril  Glucophage  Zocor    Past Medical History:     ASCVD  Diabetes  Hyperlipidemia  Hypertension  NSTEMI  Osteopenia     Past Surgical History:    Cholecystectomy  Right humerus fracture surgery  Right eye cataract extraction with IOL  RCA stent placement  Colonoscopy     Family History:    MI, mother/father/brother    Social History:  Presents to ED with son.   PCP: Nini Morrow    Physical Exam     Patient Vitals for the past 24 hrs:   BP Temp Temp src Pulse Resp SpO2 Weight   11/13/21 2134 --  -- -- -- -- 94 % --   11/13/21 2133 -- -- -- -- -- 94 % --   11/13/21 2132 -- -- -- -- -- 93 % --   11/13/21 2126 -- -- -- -- -- -- 73.5 kg (162 lb)   11/13/21 2117 -- -- -- -- -- 90 % --   11/13/21 2115 134/70 -- -- 71 -- 90 % --   11/13/21 1831 -- -- -- -- -- 97 % --   11/13/21 1830 (!) 147/79 -- -- 68 -- 97 % --   11/13/21 1820 (!) 162/78 98.5  F (36.9  C) Oral 76 18 96 % --       Physical Exam  General: Well appearing, nontoxic. Resting comfortably  Head:  Scalp, face, and head appear normal, atraumatic non tender to palpation  Eyes:  Pupils are equal, round, reactive to light     Conjunctivae non-injected and sclerae white  ENT:    The external nose is normal    Pinnae are normal  Neck:  Normal range of motion    There is no rigidity noted    Trachea is in the midline  CV:  Regular rate and rhythm     Normal S1/S2, no S3/S4    No murmur or rub. Radial pulses 2+ bilaterally.  Resp:  Lungs are clear and equal bilaterally  There is no tachypnea    No increased work of breathing    No rales, wheezing, or rhonchi  GI:  Abdomen is soft, no rigidity or guarding    No distension, or mass    No tenderness or rebound tenderness   MS:  Normal muscular tone. Moderate pain with ROM of the left hip. No focal bony tenderness to palpation. Left lower extremity is shortened and externally rotated.  The remainder of the extremities are otherwise atraumatic with full painless range of motion.  No other bony tenderness.  Pelvis stable to compression. CMS intact bilateral lower extremities.     Symmetric motor strength    No lower extremity edema  Skin:  No rash or acute skin lesions noted  Neuro: A&Ox3, GCS 15    CN II - XII intact    Speech clear, fluent, and normal    Strength 5/5 and symmetric in bilateral upper and lower extremities.    No pronator drift. No leg drift on left. SILT throughout.    No ankle clonus    FTN testing normal. No tremor.     No meningismus   Psych:  Normal affect. Appropriate interactions.    Emergency  Department Course   ECG  ECG obtained at 2052, ECG read at 2057  Normal sinus rhythm  Nonspecific ST abnormality  Abnormal ECG   Rate 77 bpm. WY interval 186 ms. QRS duration 94 ms. QT/QTc 422/477 ms. P-R-T axes 51 -22 26.     Imaging:  XR Chest Port 1 View   Final Result   IMPRESSION: Heart size and pulmonary vessels are normal. Very minimal blunting at left costophrenic angle, lungs otherwise clear. No fracture identified.      XR Pelvis and Hip Right 2 Views   Final Result   IMPRESSION: Acute fracture of the right femoral neck with lateral and superior displacement of the distal femur. Resultant varus deformity of the right hip. Small medial free fracture fragment. Mild hypertrophic change in the right hip joint without    joint space narrowing. Osteopenia. Mild hypertrophic change in the left hip joint. Degenerative changes lower lumbar spine.        Report per radiology    Laboratory:  Labs Ordered and Resulted from Time of ED Arrival to Time of ED Departure   BASIC METABOLIC PANEL - Abnormal       Result Value    Sodium 141      Potassium 3.3 (*)     Chloride 106      Carbon Dioxide (CO2) 28      Anion Gap 7      Urea Nitrogen 16      Creatinine 0.64      Calcium 8.8      Glucose 178 (*)     GFR Estimate 84     CBC WITH PLATELETS AND DIFFERENTIAL - Abnormal    WBC Count 15.7 (*)     RBC Count 4.43      Hemoglobin 13.8      Hematocrit 42.4      MCV 96      MCH 31.2      MCHC 32.5      RDW 12.5      Platelet Count 243      % Neutrophils 88      % Lymphocytes 7      % Monocytes 4      % Eosinophils 0      % Basophils 0      % Immature Granulocytes 1      NRBCs per 100 WBC 0      Absolute Neutrophils 13.6 (*)     Absolute Lymphocytes 1.2      Absolute Monocytes 0.6      Absolute Eosinophils 0.1      Absolute Basophils 0.1      Absolute Immature Granulocytes 0.2 (*)     Absolute NRBCs 0.0     INR - Normal    INR 0.92     COVID-19 VIRUS (CORONAVIRUS) BY PCR      Emergency Department Course:  Reviewed:  I reviewed  nursing notes, vitals, past medical history, Care Everywhere and [facility].    ED Course as of 11/13/21 2138   Sat Nov 13, 2021 1853 I obtained history and examined the patient as noted above.    2044 I rechecked the patient and explained findings.   2119  I spoke to Dr. Esquivel of the hospitalist service who accepts the patient for admission.       Interventions:  2131 Potassium Chloride 20 mEq, PO    Disposition:  The patient was admitted to the hospital under the care of Dr. Esquivel.     Impression & Plan     Medical Decision Making:  Terra Bolanos is a 81 year old female presents today after a fall with right hip pain.  On my evaluation she is well-appearing, hemodynamically stable and afebrile.  Head to toe trauma evaluation does not reveal any evidence of other acute traumatic injuries.  Patient has discomfort in the right hip as well as shortening and rotation of the right lower extremity concerning for fracture.  Radiographs of the hip and pelvis were obtained and revealed a right femoral neck fracture with displacement.  Neurovascular status is normal.  The remainder of her ED work-up is otherwise reassuring.  Mild hypokalemia.  Oral potassium repletion was provided.  Chest x-ray negative for any acute findings.  Given her fracture patient required mission to the hospital for ongoing monitoring evaluation and treatment with orthopedic consultation and need for likely surgical intervention.  The patient was agreeable to plan of care.  The case was discussed with the hospitalist and the patient was admitted in stable condition.      Diagnosis:    ICD-10-CM    1. Closed displaced fracture of right femoral neck (H)  S72.001A      Scribe Disclosure:  IMerry, am serving as a scribe at 6:51 PM on 11/13/2021 to document services personally performed by Tanner Hernández MD based on my observations and the provider's statements to me.      Tanner Hernández MD  11/13/21 [id number]

## 2021-11-14 NOTE — ED NOTES
RECEIVING UNIT ED HANDOFF REVIEW    Above ED Nurse Handoff Report was reviewed:  YES  Reviewed by: Janneth Lovell, RN on November 13, 2021 at 10:18 PM   Did you vocera the ED RN: Yes   [facility]  ED Nurse Handoff Report    Terra Bolanos is a 81 year old female   ED Chief complaint: Fall  . ED Diagnosis:   Final diagnoses:   Closed displaced fracture of right femoral neck (H)     Allergies:   Allergies   Allergen Reactions     Atorvastatin Other (See Comments)     stiffness       Code Status: Full Code  Activity level - Baseline/Home:  Independent. Activity Level - Current:   Assist X 1. Lift room needed: No. Bariatric: No   Needed: No   Isolation: No. Infection: Not Applicable.     Vital Signs:   Vitals:    11/13/21 2126 11/13/21 2132 11/13/21 2133 11/13/21 2134   BP:       Pulse:       Resp:       Temp:       TempSrc:       SpO2:  93% 94% 94%   Weight: 73.5 kg (162 lb)          Cardiac Rhythm:  ,      Pain level:    Patient confused: No. Patient Falls Risk: Yes.   Elimination Status: purewick in place   Patient Report - Initial Complaint: fall, hip pain. Focused Assessment: Terra Bolanos is a 81 year old female with history of ASCVD, diabetes, hypertension, and osteopenia who presents after a fall. The patient reports that she was walking to answer the phone just prior to arrival when she stubbed her toe. This caused her to fall onto the right side of her body, landing in a sitting position. She did not hit her head or lose consciousness. She was able to lift herself up onto a chair and tried to walk using her cane, but she had difficulties and had to lower herself down onto the ground. She currently has discomfort in her right hip but denies any overt pain. This discomfort increases with movement of the right leg. She does not have any pain in her right knee or numbness in her right foot. She denies any back pain, rib pain, chest pain, abdominal pain, or shortness of breath. She  stopped taking blood thinners over the summer.     Tests Performed: imaging, labs. Abnormal Results:   Abnormal Labs Resulted from Time of ED Arrival to Time of ED Departure   BASIC METABOLIC PANEL - Abnormal       Result Value    Sodium 141      Potassium 3.3 (*)     Chloride 106      Carbon Dioxide (CO2) 28      Anion Gap 7      Urea Nitrogen 16      Creatinine 0.64      Calcium 8.8      Glucose 178 (*)     GFR Estimate 84     CBC WITH PLATELETS AND DIFFERENTIAL - Abnormal    WBC Count 15.7 (*)     RBC Count 4.43      Hemoglobin 13.8      Hematocrit 42.4      MCV 96      MCH 31.2      MCHC 32.5      RDW 12.5      Platelet Count 243      % Neutrophils 88      % Lymphocytes 7      % Monocytes 4      % Eosinophils 0      % Basophils 0      % Immature Granulocytes 1      NRBCs per 100 WBC 0      Absolute Neutrophils 13.6 (*)     Absolute Lymphocytes 1.2      Absolute Monocytes 0.6      Absolute Eosinophils 0.1      Absolute Basophils 0.1      Absolute Immature Granulocytes 0.2 (*)     Absolute NRBCs 0.0          Treatments provided: pt has declined pain meds  Family Comments: sonRaffy involved and supportive  OBS brochure/video discussed/provided to patient:  N/A  ED Medications:   Medications   potassium chloride (KLOR-CON) Packet 20 mEq (20 mEq Oral Given 11/13/21 2131)     Drips infusing:  No  For the majority of the shift, the patient's behavior Green. Interventions performed were NA.    Sepsis treatment initiated: No     Patient tested for COVID 19 prior to admission: YES    ED Nurse Name/Phone Number: Kiersten Doshi RN,   9:36 PM

## 2021-11-14 NOTE — ED TRIAGE NOTES
Patient presents to the ED with right hip pain following a fall. Patient states she stood up and stubbed her toe, which caused her to fall.

## 2021-11-14 NOTE — ANESTHESIA PREPROCEDURE EVALUATION
Anesthesia Pre-Procedure Evaluation    Patient: Terra Bolanos   MRN: [id number] : 1940        Preoperative Diagnosis: Femoral neck fracture (H) [S72.009A]    Procedure : Procedure(s):  ARTHROPLASTY, HIP, TOTAL          No past medical history on file.   Past Surgical History:   Procedure Laterality Date     CHOLECYSTECTOMY       EYE SURGERY       HUMERUS FRACTURE SURGERY Right     Metal plate in right arm     PHACOEMULSIFICATION CLEAR CORNEA WITH STANDARD INTRAOCULAR LENS IMPLANT Right 2015    Procedure: PHACOEMULSIFICATION OF CATARACT WITH MULTIFOCAL INTRAOCULAR LENS IMPLANT, RIGHT EYE postponed till later in morning;  Surgeon: Orlando Cortés MD;  Location: [facility];  Service:       Allergies   Allergen Reactions     Atorvastatin Other (See Comments)     stiffness      Social History     Tobacco Use     Smoking status: Former Smoker     Quit date: 1971     Years since quittin.4     Smokeless tobacco: Never Used   Substance Use Topics     Alcohol use: No      Wt Readings from Last 1 Encounters:   21 73.5 kg (162 lb)        Anesthesia Evaluation   Pt has had prior anesthetic. Type: General and MAC.    No history of anesthetic complications       ROS/MED HX  ENT/Pulmonary:  - neg pulmonary ROS     Neurologic:  - neg neurologic ROS     Cardiovascular:     (+) Dyslipidemia hypertension--CAD -past MI -stent-. Drug Eluting Stent.     METS/Exercise Tolerance:     Hematologic:  - neg hematologic  ROS     Musculoskeletal:   (+) arthritis, fracture, Fracture location: RLE,     GI/Hepatic:  - neg GI/hepatic ROS     Renal/Genitourinary:  - neg Renal ROS     Endo:     (+) type II DM, Not using insulin, - not using insulin pump. not previously admitted for DM/DKA.     Psychiatric/Substance Use:  - neg psychiatric ROS     Infectious Disease:  - neg infectious disease ROS     Malignancy:  - neg malignancy ROS     Other:  - neg other ROS          Physical Exam    Airway        Mallampati:  II   TM distance: > 3 FB   Neck ROM: full   Mouth opening: > 3 cm    Respiratory Devices and Support         Dental  no notable dental history         Cardiovascular   cardiovascular exam normal       Rhythm and rate: regular and normal     Pulmonary   pulmonary exam normal        breath sounds clear to auscultation       Other findings: Lab Test        11/14/21 11/13/21                       0744          1922          WBC          9.4          15.7*         HGB          13.4         13.8          MCV          96           96            PLT          230          243           INR           --          0.92           Lab Test        11/14/21 11/14/21 11/14/21 11/14/21 11/13/21 11/13/21                       1330          0745          0744          0205          2307          1922          NA            --           --          138           --           --          141           POTASSIUM     --           --          3.2*         3.5           --          3.3*          CHLORIDE      --           --          105           --           --          106           CO2           --           --          28            --           --          28            BUN           --           --          15            --           --          16            CR            --           --          0.61          --           --          0.64          ANIONGAP      --           --          5             --           --          7             ILANA           --           --          8.8           --           --          8.8           GLC          156*         162*         177*          --            < >        178*           < > = values in this interval not displayed.                        EKG Interpretation:   Sinus rhythm Nonspecific ST abnormality Abnormal ECG No previous ECGs available      OUTSIDE LABS:  CBC:   Lab Results   Component Value Date    WBC 9.4 11/14/2021    WBC 15.7 (H) 11/13/2021    HGB 13.4  11/14/2021    HGB 13.8 11/13/2021    HCT 41.3 11/14/2021    HCT 42.4 11/13/2021     11/14/2021     11/13/2021     BMP:   Lab Results   Component Value Date     11/14/2021     11/13/2021    POTASSIUM 3.2 (L) 11/14/2021    POTASSIUM 3.5 11/14/2021    CHLORIDE 105 11/14/2021    CHLORIDE 106 11/13/2021    CO2 28 11/14/2021    CO2 28 11/13/2021    BUN 15 11/14/2021    BUN 16 11/13/2021    CR 0.61 11/14/2021    CR 0.64 11/13/2021     (H) 11/14/2021     (H) 11/14/2021     COAGS:   Lab Results   Component Value Date    INR 0.92 11/13/2021     POC: No results found for: BGM, HCG, HCGS  HEPATIC: No results found for: ALBUMIN, PROTTOTAL, ALT, AST, GGT, ALKPHOS, BILITOTAL, BILIDIRECT, KIRBY  OTHER:   Lab Results   Component Value Date    A1C 6.8 (H) 11/13/2021    ILANA 8.8 11/14/2021    MAG 1.9 11/13/2021       Anesthesia Plan    ASA Status:  3      Anesthesia Type: General.     - Airway: LMA   Induction: Intravenous.   Maintenance: Balanced.        Consents    Anesthesia Plan(s) and associated risks, benefits, and realistic alternatives discussed. Questions answered and patient/representative(s) expressed understanding.     - Discussed with:  Patient      - Extended Intubation/Ventilatory Support Discussed: No.      - Patient is DNR/DNI Status: No    Use of blood products discussed: No .     Postoperative Care       PONV prophylaxis: Ondansetron (or other 5HT-3), Dexamethasone or Solumedrol     Comments:                Castillo Bravo MD

## 2021-11-14 NOTE — PROGRESS NOTES
[facility]  Hospitalist Progress Note    Assessment & Plan   Terra Bolanos is a 81 year old female with known history of atherosclerotic vascular disease, non-insulin-requiring diabetes mellitus, hypertension and osteopenia who presented here at the emergency room coming from her home as unfortunately she apparently had a mechanical fall while she was walking to answer her phone when she stubbed her toe that led for her to fall landing in a sitting position.      Right femoral neck fracture secondary to mechanical fall with osteopenia   -Orthopedic surgery consulted-awaiting recommendations  -Bedrest  -Supportive cares with antiemetics, pain control, n.p.o., and PT/OT postoperative consults.  We will hold DVT prophylaxis defer to Ortho when to resume.    Hypokalemia: Potassium 3.2. Replacement protocol.     Stress-induced leukocytosis: WBC 15.7 on admission. Resolved.     Non-insulin dependent diabetes mellitus: PTA metformin held. POCT glucose ACHS. sliding scale insulin. Monitor.     Hypertension: PTA atenolol, chlorthalidone, lisinopril    HLD: PTA atorvastatin    History of atherosclerotic cardiovascular disease; MI s/p PCI 2020: Given no active cardiac symptoms and off DAPT since 9/2021. EKG reviewed on admission. No additional cardiac workup ordered. ASA to be ordered once cleared by Ortho.     COVID-19: Negative.     FEN: Sips; monitor; NPO adv post-op   Activity: PT/OT  DVT Prophylaxis: Pneumatic Compression Devices  Family update: Yes, son at bedside  Cortés: Placed for acute urinary retention - assess post-operatively removal timing    Code Status: Full Code    Disposition: 1-2 days pending surgery and therapy.     Text Page (7am - 6pm, M-F)    Interval History   Patient is resting in bed.  Pain is adequately controlled.  She denies cardiac or respiratory symptoms.  No GI symptoms.  Had acute urine retention overnight and Cortés catheter was placed.  Overall is doing well.  She is  waiting for Ortho assessment.  Son at bedside.  Discussed with nursing.    -Data reviewed today: I reviewed all new labs and imaging results over the last 24 hours. I personally reviewed:     Physical Exam   Temp: 97.9  F (36.6  C) Temp src: Temporal BP: (!) 156/76 Pulse: 79   Resp: 16 SpO2: 94 % O2 Device: None (Room air)    Vitals:    11/13/21 2126   Weight: 73.5 kg (162 lb)     Vital Signs with Ranges  Temp:  [97.7  F (36.5  C)-100.8  F (38.2  C)] 100.8  F (38.2  C)  Pulse:  [68-79] 69  Resp:  [16-19] 18  BP: (134-162)/(60-79) 156/76  SpO2:  [90 %-97 %] 94 %  I/O last 3 completed shifts:  In: -   Out: 1000 [Urine:1000]    Constitutional: Awake, alert, cooperative, no apparent distress. Non-toxic. Appears stated age.   HEENT: Atraumatic. Normocephalic. Conjunctiva non-injected. Sclera anicteric. MMM. No erythema or exudates of posterior oral pharynx.   Respiratory: Moves air bilaterally. Clear to auscultation bilaterally, no crackles or wheezing  Cardiovascular: Regular rate and rhythm, normal S1 and S2, and no murmur noted  GI: Normal bowel sounds, soft, non-distended, non-tender  Skin/Integumen: No rashes, no cyanosis, no edema. Pedal pulses 2/4 bilaterally. No ecchymosis on bilateral hips.     Medications       [Auto Hold] atenolol  50 mg Oral Daily     [Auto Hold] atorvastatin  40 mg Oral Daily     ceFAZolin  2 g Intravenous Pre-Op/Pre-procedure x 1 dose     ceFAZolin  2 g Intravenous See Admin Instructions     [Auto Hold] chlorthalidone  12.5 mg Oral Daily     [Auto Hold] insulin aspart  1-6 Units Subcutaneous Q4H     [Auto Hold] lisinopril  10 mg Oral Daily     sodium chloride (PF)  3 mL Intracatheter Q8H     sodium chloride (PF)  3 mL Intracatheter Q8H     [Auto Hold] sodium chloride (PF)  3 mL Intracatheter Q8H     [Auto Hold] tranexamic acid  1 g Intravenous Once     [Auto Hold] tranexamic acid  1 g Intravenous Once     Data   Recent Labs   Lab 11/14/21  0745 11/14/21  0744 11/14/21  0205 11/14/21  0150  11/13/21 2307 11/13/21 1922   WBC  --  9.4  --   --   --  15.7*   HGB  --  13.4  --   --   --  13.8   MCV  --  96  --   --   --  96   PLT  --  230  --   --   --  243   INR  --   --   --   --   --  0.92   NA  --  138  --   --   --  141   POTASSIUM  --  3.2* 3.5  --   --  3.3*   CHLORIDE  --  105  --   --   --  106   CO2  --  28  --   --   --  28   BUN  --  15  --   --   --  16   CR  --  0.61  --   --   --  0.64   ANIONGAP  --  5  --   --   --  7   ILANA  --  8.8  --   --   --  8.8   * 177*  --  208*   < > 178*    < > = values in this interval not displayed.     Recent Results (from the past 24 hour(s))   XR Pelvis and Hip Right 2 Views    Narrative    EXAM: XR PELVIS AND HIP RIGHT 2 VIEWS  LOCATION: [facility]  DATE/TIME: 11/13/2021 8:19 PM    INDICATION: Fall, hip Pain  COMPARISON: None.      Impression    IMPRESSION: Acute fracture of the right femoral neck with lateral and superior displacement of the distal femur. Resultant varus deformity of the right hip. Small medial free fracture fragment. Mild hypertrophic change in the right hip joint without   joint space narrowing. Osteopenia. Mild hypertrophic change in the left hip joint. Degenerative changes lower lumbar spine.   XR Chest Port 1 View    Narrative    EXAM: XR CHEST PORT 1 VIEW  LOCATION: [facility]  DATE/TIME: 11/13/2021 9:21 PM    INDICATION: fall with injury, eval for trauma  COMPARISON: None.      Impression    IMPRESSION: Heart size and pulmonary vessels are normal. Very minimal blunting at left costophrenic angle, lungs otherwise clear. No fracture identified.

## 2021-11-14 NOTE — H&P
[facility]  Hospitalist Admission Note  Name: Terra Bolanos    MRN: [id number]  YOB: 1940    Age: 81 year old  Date of admission: 11/13/2021  Primary care provider: Nini Morrow            Assessment and Plan:   Terra Bolanos is a 81 year old female with known history of atherosclerotic vascular disease, non-insulin-requiring diabetes mellitus, hypertension and osteopenia who presented here at the emergency room coming from her home as unfortunately she apparently had a mechanical fall while she was walking to answer her phone when she stubbed her toe that led for her to fall landing in a sitting position.  She denies any loss of consciousness or head injury and she tried to lift herself up into a chair and ambulate with an aid of her cane but was not able to bear weight on it and has to lower down herself on the ground.  Fortunately she was not complaining of severe hip pain.      1.  Right femoral neck acute fracture secondary to apparent mechanical fall  2.  Hypokalemia  3.  Leukocytosis likely secondary to stress demargination from recent fall  4.  Non-insulin-requiring diabetes mellitus  5.  Hypertension  6.  History of atherosclerotic cardiovascular disease-stable with no ongoing cardiac symptoms  7.  History of osteopenia  8.  CAD-with prior MI back in July 2020 underwent PCI.  Off dual antiplatelets since September 2021.  Remain on aspirin 81 mg    Admit as inpatient.  At risk for clinical deterioration.  Patient sustained a right femoral neck fracture likely from a an apparent mechanical fall  -She has prior history of diabetes mellitus, hypertension and atherosclerotic vascular disease but denies any ongoing cardiac symptomatology.  -Prior echocardiogram done last year showing reassuring findings  -We will check for EKG for completion  -Hold her aspirin  -Given absence of cardiac symptoms and nature of fall appears to be mechanical I will not recommend any further  perioperative cardiac testing.  She appears to be optimized for operative surgical intervention if offered  -Correct her hypokalemia  -Resume her home regimen such as beta-blockers and ACE inhibitors  -Hold her Metformin given n.p.o. status after midnight and will provide her insulin sliding scale  -Available pain regimen if needed  -APAP available as well.  As needed antiemetics.  -Mechanical PCD's for now.  Will defer further DVT prophylaxis approach to orthopedic service postoperative state  -Requested formal evaluation from social service, PT, OT evaluation      Code status: Full code  Admit to inpatient  Prophylaxis: Mechanical for now  Disposition: To be determined but likely will be needing at least 2 inpatient days          Chief Complaint:   Fall, hip pain       Source of Information:   Patient with good reliability  Discussion with ED physician  Review of E chart records         History of Present Illness:   Terra Bolanos is a 81 year old female with known history of atherosclerotic vascular disease, non-insulin-requiring diabetes mellitus, hypertension and osteopenia who presented here at the emergency room coming from her home as unfortunately she apparently had a mechanical fall while she was walking to answer her phone when she stubbed her toe that led for her to fall landing in a sitting position.  She denies any loss of consciousness or head injury and she tried to lift herself up into a chair and ambulate with an aid of her cane but was not able to bear weight on it and has to lower down herself on the ground.  Fortunately she was not complaining of severe hip pain.  There is no reports of any fevers, shortness of breath, chest pain, nausea, vomiting, abdominal pain, diarrhea, bleeding tendencies, urinary symptomatology, sore throat, anosmia, ear discharge, mental status changes, seizure-like activity, coughing spells, blurry vision, lightheadedness prior to the fall event.  No reported  accompanying of back pain, abdominal pain.  Upon presentation in the emergency room she demonstrated stable hemodynamics, afebrile and not hypoxic.  Further investigation showed hypokalemia, leukocytosis with no clear evidence of infectious process, imaging did reveal acute fracture of the right femoral neck with lateral and superior displacement of the distal femur.  She was offered with pain regimen in the emergency room but she mentioned to the ED provider that her current pain level is tolerable.  Her case was referred to us for further evaluation and care hence this hospitalization              Past Medical History:   As listed above          Past Surgical History:     Past Surgical History:   Procedure Laterality Date     CHOLECYSTECTOMY       EYE SURGERY       HUMERUS FRACTURE SURGERY Right     Metal plate in right arm     PHACOEMULSIFICATION CLEAR CORNEA WITH STANDARD INTRAOCULAR LENS IMPLANT Right 2015    Procedure: PHACOEMULSIFICATION OF CATARACT WITH MULTIFOCAL INTRAOCULAR LENS IMPLANT, RIGHT EYE postponed till later in morning;  Surgeon: Orlando Cortés MD;  Location: [facility] OR;  Service:              Social History:     Social History     Tobacco Use     Smoking status: Former Smoker     Quit date: 1971     Years since quittin.4     Smokeless tobacco: Never Used   Substance Use Topics     Alcohol use: No             Family History:   Family history was fully reviewed and non-contributory in this case.         Allergies:     Allergies   Allergen Reactions     Atorvastatin Other (See Comments)     stiffness             Medications:     Prior to Admission medications    Medication Sig Last Dose Taking? Auth Provider   aspirin 81 MG EC tablet [ASPIRIN 81 MG EC TABLET] Take 81 mg by mouth daily. 2021 at 0730 Yes Provider, Historical   atenolol (TENORMIN) 50 MG tablet Take 50 mg by mouth daily 2021 at 0730 Yes Reported, Patient   atorvastatin (LIPITOR) 40 MG tablet Take 40 mg  by mouth daily 11/12/2021 at 2200 Yes Reported, Patient   chlorthalidone (HYGROTON) 25 MG tablet Take 12.5 mg by mouth daily 11/13/2021 at 0730 Yes Reported, Patient   lisinopril (PRINIVIL,ZESTRIL) 10 MG tablet [LISINOPRIL (PRINIVIL,ZESTRIL) 10 MG TABLET] Take 10 mg by mouth daily. 11/13/2021 at 0730 Yes Provider, Historical   metFORMIN (GLUCOPHAGE) 500 MG tablet [METFORMIN (GLUCOPHAGE) 500 MG TABLET] Take 500 mg by mouth 2 (two) times a day with meals. 11/13/2021 at 0730 Yes Provider, Historical   nitroGLYcerin (NITROSTAT) 0.4 MG sublingual tablet Place 0.4 mg under the tongue every 5 minutes as needed for chest pain For chest pain place 1 tablet under the tongue every 5 minutes for 3 doses. If symptoms persist 5 minutes after 1st dose call 911. Unknown Yes Reported, Patient             Review of Systems:   A Comprehensive greater than 10 system review of systems was carried out.  Pertinent positives and negatives are noted above.  Otherwise negative for contributory information.           Physical Exam:   Blood pressure 134/70, pulse 71, temperature 98.5  F (36.9  C), temperature source Oral, resp. rate 18, weight 73.5 kg (162 lb), SpO2 94 %.  Wt Readings from Last 1 Encounters:   11/13/21 73.5 kg (162 lb)     Exam:  GENERAL: No apparent distress. Awake, alert, and fully oriented.  HEENT: Normocephalic, atraumatic. Extraocular movements intact.  CARDIOVASCULAR: Regular rate and rhythm without murmurs or rubs. No JVD  PULMONARY: Clear to auscultation, no wheezes, crackles  ABDOMINAL: Soft, non-tender, non-distended. Bowel sounds normoactive. No hepatosplenomegaly.  EXTREMITIES: No cyanosis or clubbing.  Limitation of movement lower extremities  NEUROLOGICAL: CN 2-12 grossly intact, awake and alert x3, spontaneous and coherent speech. no focal neurological deficits.  DERMATOLOGICAL: No rash, ulcer, ecchymoses, jaundice.  Psych: not agitation, not combative, pleasant mood           Data:   EKG: No new EKG seen in  epic    Prior echocardiogram done in an outside facility last 7/19/2020 was reviewed and showed normal bef, no significant valvular heart disease,    Imaging:  Recent Results (from the past 48 hour(s))   XR Pelvis and Hip Right 2 Views    Narrative    EXAM: XR PELVIS AND HIP RIGHT 2 VIEWS  LOCATION: [facility]  DATE/TIME: 11/13/2021 8:19 PM    INDICATION: Fall, hip Pain  COMPARISON: None.      Impression    IMPRESSION: Acute fracture of the right femoral neck with lateral and superior displacement of the distal femur. Resultant varus deformity of the right hip. Small medial free fracture fragment. Mild hypertrophic change in the right hip joint without   joint space narrowing. Osteopenia. Mild hypertrophic change in the left hip joint. Degenerative changes lower lumbar spine.   XR Chest Port 1 View    Narrative    EXAM: XR CHEST PORT 1 VIEW  LOCATION: [facility]  DATE/TIME: 11/13/2021 9:21 PM    INDICATION: fall with injury, eval for trauma  COMPARISON: None.      Impression    IMPRESSION: Heart size and pulmonary vessels are normal. Very minimal blunting at left costophrenic angle, lungs otherwise clear. No fracture identified.       Labs:  No results for input(s): CULT in the last 168 hours.  Recent Labs   Lab 11/13/21 1922   WBC 15.7*   HGB 13.8   HCT 42.4   MCV 96        Recent Labs   Lab 11/13/21 1922      POTASSIUM 3.3*   CHLORIDE 106   CO2 28   ANIONGAP 7   *   BUN 16   CR 0.64   GFRESTIMATED 84   ILANA 8.8     No results for input(s): SED, CRP in the last 168 hours.  Recent Labs   Lab 11/13/21 1922   *     Recent Labs   Lab 11/13/21 1922   INR 0.92

## 2021-11-14 NOTE — ED NOTES
Bed: ED12  Expected date:   Expected time:   Means of arrival:   Comments:  Arun 595 Fall, hip pain

## 2021-11-14 NOTE — PLAN OF CARE
Admitted to room 649 at 2200. A&OX4, oriented to room and call system. VSS: stable.NPO from midnight. On bedrest. Pain with movement, IV dilaudid given with some relief.  Blood glucose monitored, no corrections needed overnight. Potassium replaced at ED, recheck lab ordered and is 3.5.  Bedbath given.Not able to void, Cortés placed, putting out good amounts. Ortho on consult for possible surgical intervention.

## 2021-11-15 ENCOUNTER — APPOINTMENT (OUTPATIENT)
Dept: PHYSICAL THERAPY | Facility: CLINIC | Age: 81
DRG: 522 | End: 2021-11-15
Payer: COMMERCIAL

## 2021-11-15 ENCOUNTER — APPOINTMENT (OUTPATIENT)
Dept: OCCUPATIONAL THERAPY | Facility: CLINIC | Age: 81
DRG: 522 | End: 2021-11-15
Attending: INTERNAL MEDICINE
Payer: COMMERCIAL

## 2021-11-15 ENCOUNTER — APPOINTMENT (OUTPATIENT)
Dept: PHYSICAL THERAPY | Facility: CLINIC | Age: 81
DRG: 522 | End: 2021-11-15
Attending: INTERNAL MEDICINE
Payer: COMMERCIAL

## 2021-11-15 LAB
ANION GAP SERPL CALCULATED.3IONS-SCNC: 4 MMOL/L (ref 3–14)
ATRIAL RATE - MUSE: 77 BPM
BUN SERPL-MCNC: 19 MG/DL (ref 7–30)
CALCIUM SERPL-MCNC: 8.5 MG/DL (ref 8.5–10.1)
CHLORIDE BLD-SCNC: 105 MMOL/L (ref 94–109)
CO2 SERPL-SCNC: 28 MMOL/L (ref 20–32)
CREAT SERPL-MCNC: 0.67 MG/DL (ref 0.52–1.04)
DIASTOLIC BLOOD PRESSURE - MUSE: NORMAL MMHG
ERYTHROCYTE [DISTWIDTH] IN BLOOD BY AUTOMATED COUNT: 12.7 % (ref 10–15)
GFR SERPL CREATININE-BSD FRML MDRD: 83 ML/MIN/1.73M2
GLUCOSE BLD-MCNC: 180 MG/DL (ref 70–99)
GLUCOSE BLDC GLUCOMTR-MCNC: 180 MG/DL (ref 70–99)
GLUCOSE BLDC GLUCOMTR-MCNC: 182 MG/DL (ref 70–99)
GLUCOSE BLDC GLUCOMTR-MCNC: 250 MG/DL (ref 70–99)
HCT VFR BLD AUTO: 35.6 % (ref 35–47)
HGB BLD-MCNC: 11.3 G/DL (ref 11.7–15.7)
INTERPRETATION ECG - MUSE: NORMAL
MAGNESIUM SERPL-MCNC: 2.1 MG/DL (ref 1.6–2.3)
MCH RBC QN AUTO: 31 PG (ref 26.5–33)
MCHC RBC AUTO-ENTMCNC: 31.7 G/DL (ref 31.5–36.5)
MCV RBC AUTO: 98 FL (ref 78–100)
P AXIS - MUSE: 51 DEGREES
PLATELET # BLD AUTO: 185 10E3/UL (ref 150–450)
POTASSIUM BLD-SCNC: 4.1 MMOL/L (ref 3.4–5.3)
PR INTERVAL - MUSE: 186 MS
QRS DURATION - MUSE: 94 MS
QT - MUSE: 422 MS
QTC - MUSE: 477 MS
R AXIS - MUSE: -22 DEGREES
RBC # BLD AUTO: 3.65 10E6/UL (ref 3.8–5.2)
SODIUM SERPL-SCNC: 137 MMOL/L (ref 133–144)
SYSTOLIC BLOOD PRESSURE - MUSE: NORMAL MMHG
T AXIS - MUSE: 26 DEGREES
VENTRICULAR RATE- MUSE: 77 BPM
WBC # BLD AUTO: 11.9 10E3/UL (ref 4–11)

## 2021-11-15 PROCEDURE — 250N000011 HC RX IP 250 OP 636: Performed by: PHYSICIAN ASSISTANT

## 2021-11-15 PROCEDURE — 97110 THERAPEUTIC EXERCISES: CPT | Mod: GP | Performed by: PHYSICAL THERAPIST

## 2021-11-15 PROCEDURE — 83735 ASSAY OF MAGNESIUM: CPT | Performed by: INTERNAL MEDICINE

## 2021-11-15 PROCEDURE — 250N000013 HC RX MED GY IP 250 OP 250 PS 637: Performed by: INTERNAL MEDICINE

## 2021-11-15 PROCEDURE — 97165 OT EVAL LOW COMPLEX 30 MIN: CPT | Mod: GO | Performed by: OCCUPATIONAL THERAPIST

## 2021-11-15 PROCEDURE — 80048 BASIC METABOLIC PNL TOTAL CA: CPT | Performed by: INTERNAL MEDICINE

## 2021-11-15 PROCEDURE — 97530 THERAPEUTIC ACTIVITIES: CPT | Mod: GP

## 2021-11-15 PROCEDURE — 97116 GAIT TRAINING THERAPY: CPT | Mod: GP

## 2021-11-15 PROCEDURE — 36415 COLL VENOUS BLD VENIPUNCTURE: CPT | Performed by: INTERNAL MEDICINE

## 2021-11-15 PROCEDURE — 97161 PT EVAL LOW COMPLEX 20 MIN: CPT | Mod: GP | Performed by: PHYSICAL THERAPIST

## 2021-11-15 PROCEDURE — 97530 THERAPEUTIC ACTIVITIES: CPT | Mod: GP | Performed by: PHYSICAL THERAPIST

## 2021-11-15 PROCEDURE — 250N000013 HC RX MED GY IP 250 OP 250 PS 637: Performed by: PHYSICIAN ASSISTANT

## 2021-11-15 PROCEDURE — 258N000003 HC RX IP 258 OP 636: Performed by: PHYSICIAN ASSISTANT

## 2021-11-15 PROCEDURE — 120N000001 HC R&B MED SURG/OB

## 2021-11-15 PROCEDURE — 97116 GAIT TRAINING THERAPY: CPT | Mod: GP | Performed by: PHYSICAL THERAPIST

## 2021-11-15 PROCEDURE — 99232 SBSQ HOSP IP/OBS MODERATE 35: CPT | Performed by: INTERNAL MEDICINE

## 2021-11-15 PROCEDURE — 97535 SELF CARE MNGMENT TRAINING: CPT | Mod: GO | Performed by: OCCUPATIONAL THERAPIST

## 2021-11-15 PROCEDURE — 85027 COMPLETE CBC AUTOMATED: CPT | Performed by: INTERNAL MEDICINE

## 2021-11-15 RX ADMIN — SODIUM CHLORIDE, POTASSIUM CHLORIDE, SODIUM LACTATE AND CALCIUM CHLORIDE: 600; 310; 30; 20 INJECTION, SOLUTION INTRAVENOUS at 05:28

## 2021-11-15 RX ADMIN — LISINOPRIL 10 MG: 10 TABLET ORAL at 08:34

## 2021-11-15 RX ADMIN — CHLORTHALIDONE 12.5 MG: 25 TABLET ORAL at 08:35

## 2021-11-15 RX ADMIN — ATENOLOL 50 MG: 50 TABLET ORAL at 08:35

## 2021-11-15 RX ADMIN — Medication 1 LOZENGE: at 16:47

## 2021-11-15 RX ADMIN — CEFAZOLIN SODIUM 1 G: 1 INJECTION, SOLUTION INTRAVENOUS at 08:55

## 2021-11-15 RX ADMIN — ATORVASTATIN CALCIUM 40 MG: 40 TABLET, FILM COATED ORAL at 20:24

## 2021-11-15 RX ADMIN — ASPIRIN 325 MG: 325 TABLET, COATED ORAL at 08:34

## 2021-11-15 ASSESSMENT — ACTIVITIES OF DAILY LIVING (ADL)
ADLS_ACUITY_SCORE: 7
ADLS_ACUITY_SCORE: 6
ADLS_ACUITY_SCORE: 6
ADLS_ACUITY_SCORE: 9
ADLS_ACUITY_SCORE: 11
ADLS_ACUITY_SCORE: 9
ADLS_ACUITY_SCORE: 6
ADLS_ACUITY_SCORE: 9
ADLS_ACUITY_SCORE: 6
ADLS_ACUITY_SCORE: 9
ADLS_ACUITY_SCORE: 11
ADLS_ACUITY_SCORE: 7
ADLS_ACUITY_SCORE: 6
ADLS_ACUITY_SCORE: 9
ADLS_ACUITY_SCORE: 6
ADLS_ACUITY_SCORE: 6
PREVIOUS_RESPONSIBILITIES: MEAL PREP;HOUSEKEEPING;LAUNDRY;SHOPPING;YARDWORK;MEDICATION MANAGEMENT;FINANCES;DRIVING
ADLS_ACUITY_SCORE: 6

## 2021-11-15 NOTE — PROGRESS NOTES
[facility]  Hospitalist Progress Note    Assessment & Plan   Terra Bolanos is a 81 year old female with known history of atherosclerotic vascular disease, non-insulin-requiring diabetes mellitus, hypertension and osteopenia who presented here at the emergency room coming from her home as unfortunately she apparently had a mechanical fall while she was walking to answer her phone when she stubbed her toe that led for her to fall landing in a sitting position.      Right femoral neck fracture s/p right total hip arthroplasty secondary to mechanical fall with osteopenia   -Orthopedic surgery consulted - appreciate assistance - adv with therapy  -Supportive cares with antiemetics, pain control, ASA, and PT/OT    Acute blood loss anemia: Hemoglobin 13.4 on admission. Postoperatively 11.3. No signs of active bleeding  ml. Monitor.     Hypokalemia: Potassium 3.2. Replacement protocol. Resolved.     Stress-induced leukocytosis: WBC 15.7 on admission - improving.     Non-insulin dependent diabetes mellitus: PTA metformin held. POCT glucose ACHS. sliding scale insulin. Monitor.     Hypertension: PTA atenolol, chlorthalidone, lisinopril    HLD: PTA atorvastatin    History of atherosclerotic cardiovascular disease; MI s/p PCI 2020: Given no active cardiac symptoms and off DAPT since 9/2021. EKG reviewed on admission. No additional cardiac workup ordered. ASA resumed post-operatively.     COVID-19: Negative.     FEN: Oral hydration; monitor; regular  Activity: PT/OT - home with assist   DVT Prophylaxis: Pneumatic Compression Devices  Family update: Patient updating family   Cortés: Catheter removed     Code Status: Full Code    Disposition: Tomorrow discharge home pending PT/Kiana clearance.     Text Page (7am - 6pm, M-F)    Interval History   Patient is doing well today. She is progressing with therapy. No chest pain or respiratory symptoms. Pain tolerable. Cortés removed and urinating. Passing gas.  Tolerating diet. Discussed with nursing.     -Data reviewed today: I reviewed all new labs and imaging results over the last 24 hours. I personally reviewed:     Physical Exam   Temp: 98.8  F (37.1  C) Temp src: Temporal BP: 107/49 Pulse: 74   Resp: 14 SpO2: 96 % O2 Device: None (Room air) Oxygen Delivery: 2 LPM  Vitals:    11/13/21 2126   Weight: 73.5 kg (162 lb)     Vital Signs with Ranges  Temp:  [96.5  F (35.8  C)-98.8  F (37.1  C)] 98.8  F (37.1  C)  Pulse:  [51-74] 74  Resp:  [8-19] 16  BP: ()/() 107/49  SpO2:  [85 %-100 %] 93 %  I/O last 3 completed shifts:  In: 2373.75 [P.O.:480; I.V.:1893.75]  Out: 850 [Urine:850]    Constitutional: Awake, alert, cooperative, no apparent distress. Non-toxic. Appears stated age.   HEENT: Atraumatic. Normocephalic. Conjunctiva non-injected. Sclera anicteric. MMM. No erythema or exudates of posterior oral pharynx.   Respiratory: Moves air bilaterally. Clear to auscultation bilaterally, no crackles or wheezing  Cardiovascular: Regular rate and rhythm, normal S1 and S2, and no murmur noted  GI: Normal bowel sounds, soft, non-distended, non-tender  Skin/Integumen: No rashes, no cyanosis, no edema. Pedal pulses 2/4 bilaterally.     Medications       aspirin  325 mg Oral Daily     atenolol  50 mg Oral Daily     atorvastatin  40 mg Oral Daily     chlorthalidone  12.5 mg Oral Daily     lisinopril  10 mg Oral Daily     scopolamine  1 patch Transdermal Once     sodium chloride (PF)  3 mL Intracatheter Q8H     Data   Recent Labs   Lab 11/15/21  1231 11/15/21  0806 11/15/21  0739 11/14/21  1905 11/14/21  1450 11/14/21  0745 11/14/21  0744 11/13/21  2307 11/13/21  1922   WBC  --  11.9*  --   --   --   --  9.4  --  15.7*   HGB  --  11.3*  --   --   --   --  13.4  --  13.8   MCV  --  98  --   --   --   --  96  --  96   PLT  --  185  --   --   --   --  230  --  243   INR  --   --   --   --   --   --   --   --  0.92   NA  --  137  --   --   --   --  138  --  141   POTASSIUM  --   4.1  --   --  4.3  --  3.2*   < > 3.3*   CHLORIDE  --  105  --   --   --   --  105  --  106   CO2  --  28  --   --   --   --  28  --  28   BUN  --  19  --   --   --   --  15  --  16   CR  --  0.67  --   --   --   --  0.61  --  0.64   ANIONGAP  --  4  --   --   --   --  5  --  7   ILANA  --  8.5  --   --   --   --  8.8  --  8.8   * 180* 182*   < >  --    < > 177*   < > 178*    < > = values in this interval not displayed.     Recent Results (from the past 24 hour(s))   XR Pelvis w Hip Port Right 1 View    Narrative    EXAM: XR PELVIS AD HIP PORTABLE RIGHT 1 VIEW  LOCATION: [facility]  DATE/TIME: 11/14/2021 6:48 PM    INDICATION: post op assessment  COMPARISON: 11/13/2021      Impression    IMPRESSION: Right MEGAN with cerclage wire fixation has been performed in the interval. No fractures are evident. Postoperative air is present. Skin staples are in place. Osteopenia.

## 2021-11-15 NOTE — OP NOTE
Posterior Total Hip Arthroplasty -     Terra Bolanos MRN# [id number]   YOB: 1940  Procedure Date:  11/14/2021  Age: 81 year old       PREOPERATIVE DIAGNOSIS:  Displaced Femoral Neck Fracture, right hip.    POSTOPERATIVE DIAGNOSIS:  Displaced Femoral Neck Fracture,right hip.    PROCEDURE PERFORMED:  Right total hip arthroplasty.  Posterior lateral piriformis sparing approach.    SURGEON:  Tanner Anders M.D.    FIRST ASSISTANT:  Deon Coker PA-C, whose was critical for positioning, retraction during exposure, placement of implants, and closure.    ANESTHESIA:  General    INDICATIONS:  Terra Bolanos  is a81 year old-year-old with displaced femoral neck fracture right hip.  Discussed both operative and nonoperative management.  Risks of surgery discussed included but not limited to bleeding, infection, damage to surrounding neurovascular structures, leg length inequality, dislocation, periprosthetic fracture, need for revision surgery, blood clots, pulmonary embolus, stroke, anesthetic complications and even death.  Discussed hip fracture mortality rates.  No guarantees given or implied. Patient thoughtfully acknowledges risks and wishes to proceed with total hip arthroplasty.    IMPLANTS:  Sumner Accolade II size 6 std offset neck, size 52 Trident II cup, neutral liner, and -2.5 mm 36 mm Biolox head    PROCEDURE:  After obtaining informed surgical consent, and marking patient operative site the patient was brought to the operating room.  Tranexamic acid 1 g given prior to surgery and additional gram at closure.  2 grams of Ancef was administered intravenously within one hour prior to surgery and will be discontinued within 24 hours.  General anesthetic.  Placed in the lateral decubitus position with axillary roll and all prominences well padded.  Chlorohexidine prescrub and the Right hip was prepped with Chloroprep and draped in the usual sterile fashion.  A posterolateral incision was  made.  Dissection was carried through the IT band and tensor fascia.  The external rotators and capsule were identified, tagged, divided, and preserved for later repair.  Piriformis sparred.  The hip was dislocated.  Displaced femoral neck fracture.  The femoral head and neck were resected approximately 15mm proximal to lesser trochanter.  The acetabulum was exposed and advanced osteoarthritis present.  A labrectomy was performed.  The pulvinar was excised.  The pulvinar was excised.  The acetabulum was sequentially reamed from 46 to 52 mm.  A 52 mm Tritanium cup was implanted with slightly increased anteversion reduced pelvic abduction of approximately 40 degrees, no gomez wall anteriorly.  Trial neutral liner placed.      Prophylactic cerclage cable placed at calcar per my routine for press fit stem for hip fracture.  The femoral canal was then opened with box osteotome, canal finder, and then lateralizer.  Sequentially broached to accept a size # 6 Accolade 2 stem trial.  After multiple trial reductions, leg lengths were equal by palpation.   Appropriate shuck.  The hip was stable throughout a full range of motion using a -2.5 x 36 mm head including full extension external rotation slight subluxation at high external rotation no dislocation, sleeping position, and flexed to 90 deg with over 80 degrees internal rotation. A neutral flat liner was snapped into position.  The stem was implanted in an approximately 5 degrees of increased anteversion as trialed for stability.  The  Biolox head was implanted after stability exam repeated on cleaned and dried lopez taper.  The hip was relocated.  The wound was was washed with Rush betadine protocol and then irrigated with antibiotic irrigating solution.  1 g vanco placed deep.  The rotators and capsule were reattached to the trochanter through drill holes.  The wound was then irrigated thoroughly and closed in layers over drained with #1 Vicryl, 2-0 Vicryl, 2-0 Stratafix  and exofin glue.  A sterile dressing was applied.  There were no intraoperative complications.  Blood loss was 300 cc.  Sponge and needle counts were correct and the patient was returned to the recovery room in stable condition.     Post Op Plan:  1.)  WBAT with Posterior hip precautions  2.)  Ancef x 24 hours  3.)  DVT prophylaxis SCDs with ASA EC 325mg PO qday x 6 weeks   4.)  Keep dressing c/d/i x 2 weeks, OK to shower  5.)  Hip abduction pillow  6.)  PACU x-rays  7.)  Follow biopsy results     Follow Up:  1.)  2 week wound check with AP pelvis and cross table lateral hip  2.)  8 weeks with AP pelvis and cross table lateral hip     [facility]  [facility]     Monday 1-5 PM  and Thursday 7:30-12:00 AM\  [address] [place] [address]  [phone]     [phone]     Or     Tuesday 7:30-5 PM  [address] [facility] [address]  [place], [place]        Tanner Anders M.D.

## 2021-11-15 NOTE — BRIEF OP NOTE
[facility]    Brief Operative Note    Pre-operative diagnosis: Femoral neck fracture (H) [S72.009A]  Post-operative diagnosis Same as pre-operative diagnosis    Procedure: Procedure(s):  Right total hip arthroplasty  Surgeon: Surgeon(s) and Role:     * Tanner Anders MD - Primary     * Deon Coker PA-C - Assisting  Anesthesia: General   Estimated Blood Loss: 300 ml    Drains: none  Specimens:   ID Type Source Tests Collected by Time Destination   1 : Right femoral head Bone Fracture Femoral Head, Right SURGICAL PATHOLOGY EXAM Tanner Anders MD 11/14/2021  5:50 PM      Findings:   None.  Complications: None.  Implants:   Implant Name Type Inv. Item Serial No.  Lot No. LRB No. Used Action   52mm E Trident II Tritanium Clusterhole Acetabular Shell    EDNA [id number] Right 1 Implanted   IMP SCR STRK LOW PROFILE HEX 6.5X30MM [id number] - [id number] Metallic Hardware/[place] IMP SCR STRK LOW PROFILE HEX 6.5X30MM [id number]  [employer] [id number] Right 1 Implanted   6.5MM LOW PROFILE HEX SCR 20MM - [id number] Metallic Hardware/[place] 6.5MM LOW PROFILE HEX SCR 20MM  EDNA [employer] [id number] Right 1 Implanted   IMP CABLE ZIM CERCLAGE 1.8X25MM 2232-04-18 - [id number] Metallic Hardware/[place] IMP CABLE ZIM CERCLAGE 1.8X25MM 2232-04-18  [employer] [id number] Right 1 Implanted   Trident X3 0 degree Polyethylene Insert    EDNA [id number] Right 1 Implanted   IMP STEM FEMORAL HIP STRK ACCOLADE II 132DEG SZ 6 1424-5333 - [id number] Total Joint Component/Insert IMP STEM FEMORAL HIP STRK ACCOLADE II 132DEG SZ 6 8780-0765  [employer] [id number] Right 1 Implanted   IMP HEAD FEMORAL STRK BIOLOX DELTA CERAMIC V40 36MM - [id number] Total Joint Component/Insert IMP HEAD FEMORAL STRK BIOLOX DELTA CERAMIC V40 36MM  [employer] [id number] Right 1 Implanted

## 2021-11-15 NOTE — ANESTHESIA CARE TRANSFER NOTE
Patient: Terra Bolanos    Procedure: Procedure(s):  Right total hip arthroplasty       Diagnosis: Femoral neck fracture (H) [S72.009A]  Diagnosis Additional Information: No value filed.    Anesthesia Type:   General     Note:    Oropharynx: oropharynx clear of all foreign objects and spontaneously breathing  Level of Consciousness: awake and drowsy  Oxygen Supplementation: face mask  Level of Supplemental Oxygen (L/min / FiO2): 6  Independent Airway: airway patency satisfactory and stable  Dentition: dentition unchanged  Vital Signs Stable: post-procedure vital signs reviewed and stable  Report to RN Given: handoff report given  Patient transferred to: PACU  Comments: No issues, constant attempts to touch eyes and face on PAR arrival   Handoff Report: Identifed the Patient, Identified the Reponsible Provider, Reviewed the pertinent medical history, Discussed the surgical course, Reviewed Intra-OP anesthesia mangement and issues during anesthesia and Allowed opportunity for questions and acknowledgement of understanding      Vitals:  Vitals Value Taken Time   /62 11/14/21 1839   Temp 98.5  F (36.9  C) 11/14/21 1839   Pulse 66 11/14/21 1843   Resp 15 11/14/21 1843   SpO2 98 % 11/14/21 1843   Vitals shown include unvalidated device data.    Electronically Signed By: DINA Torres CRNA  November 14, 2021  6:44 PM   no

## 2021-11-15 NOTE — PLAN OF CARE
Patient vital signs are at baseline: Yes, on 2 l oxygen nc, sats above 90%.  Patient able to ambulate as they were prior to admission or with assist devices provided by therapies during their stay: Up with Ax2/gbelt and walker, took 5 steps back, tolerated well.    Patient MUST void prior to discharge: Yes, had reese overnight.  Patient able to tolerate oral intake:  Yes, nausea a the beginning of the shift, Zofran given with relief.   Pain has adequate pain control using Oral analgesics: Yes, did not require any pain meds overnight.   Sleeping comfortable between cares, Denies pain.CMS: intact. Drsg: CDI. Blood glucose monitored. On Abx ancef, IVF infusing. Reese dc'd at 0600. Due to void.Prefers to go [facility] upon discharge.

## 2021-11-15 NOTE — PLAN OF CARE
Patient vital signs are at baseline: Yes  Patient able to ambulate as they were prior to admission or with assist devices provided by therapies during their stay:  Yes-Ax1, using gait belt, and walker.   Patient MUST void prior to discharge:  Yes  Patient able to tolerate oral intake:  Yes  Pain has adequate pain control using Oral analgesics:  Yes-denies pain.     Pt A&O x4. CMS intact. Dressing CDI-incision iced. Plan is for possible discharge to home tomorrow. Will continue to monitor.

## 2021-11-15 NOTE — OR NURSING
1930,X-ray in progress at the bedside. 25 mcg Fentanyl given.Pt is resting at ease,denies any pain after the last Fentanyl dose.

## 2021-11-15 NOTE — PROGRESS NOTES
11/15/21 1500   Quick Adds   Type of Visit Initial Occupational Therapy Evaluation   Living Environment   People in home alone   Current Living Arrangements house   Home Accessibility wheelchair accessible;no concerns   Living Environment Comments Home has ramp and chair lift to lower level. All needs met on main level. Standard toilet with toilet riser and rails, walk in shower with shower seat, grab bars.    Self-Care   Usual Activity Tolerance excellent   Current Activity Tolerance good   Equipment Currently Used at Home none   Activity/Exercise/Self-Care Comment independent with ADLs and mobility without an aid. has standard walker. Bed raises and lowers.   Instrumental Activities of Daily Living (IADL)   Previous Responsibilities meal prep;housekeeping;laundry;shopping;yardwork;medication management;finances;driving   IADL Comments Pt orders groceries. Pt sister and sons are able to assist at discharge.    Disability/Function   Fall history within last six months yes   Number of times patient has fallen within last six months 1   Change in Functional Status Since Onset of Current Illness/Injury yes   General Information   Onset of Illness/Injury or Date of Surgery 11/13/21   Referring Physician Deon Coker PA-C   Patient/Family Therapy Goal Statement (OT) Patient would like to return home with assist from children and sister   Additional Occupational Profile Info/Pertinent History of Current Problem 81 year old female with known history of atherosclerotic vascular disease, non-insulin-requiring diabetes mellitus, hypertension and osteopenia who presented here at the emergency room coming from her home as unfortunately she apparently had a mechanical fall while she was walking to answer her phone when she stubbed her toe that led for her to fall landing in a sitting position.   Existing Precautions/Restrictions fall   Right Lower Extremity (Weight-bearing Status) weight-bearing as tolerated (WBAT)    General Observations and Info No abduction, no adduction past midline, no external rotation, no pivoting, limited to 90 degrees of hip flexion   Cognitive Status Examination   Orientation Status orientation to person, place and time   Affect/Mental Status (Cognitive) WNL   Follows Commands WNL   Clinical Impression   Criteria for Skilled Therapeutic Interventions Met (OT) yes;meets criteria;skilled treatment is necessary   OT Diagnosis Decline in ADL independence and safety   OT Problem List-Impairments impacting ADL problems related to;activity tolerance impaired;flexibility;post-surgical precautions   Assessment of Occupational Performance 5 or more Performance Deficits   Identified Performance Deficits Decline in ADL independence and safety   Planned Therapy Interventions (OT) ADL retraining   Clinical Decision Making Complexity (OT) low complexity   Therapy Frequency (OT) Daily   Predicted Duration of Therapy 2 days   Anticipated Equipment Needs Upon Discharge (OT) dressing equipment   Risk & Benefits of therapy have been explained evaluation/treatment results reviewed;care plan/treatment goals reviewed;risks/benefits reviewed;current/potential barriers reviewed;participants voiced agreement with care plan;participants included;patient   OT Discharge Planning    OT Discharge Recommendation (DC Rec) home with home care occupational therapy;Home with assist   OT Rationale for DC Rec Patient would benefit from continued therapy in the home setting to progress activity tolerance   OT Brief overview of current status  CGA for bed mobility   Total Evaluation Time (Minutes)   Total Evaluation Time (Minutes) 34

## 2021-11-15 NOTE — CONSULTS
[facility]    Orthopedics Consultation    Date of Admission:  11/13/2021    Assessment & Plan   Terra Bolanos is a 81 year old female who was admitted on 11/13/2021 with right displaced femoral neck fracture.    Discussed both operative and nonoperative management.  Risks of surgery discussed included but not limited to bleeding, infection, damage to surrounding neurovascular structures, leg length inequality, dislocation, periprosthetic fracture, need for revision surgery, blood clots, pulmonary embolus, stroke, anesthetic complications and even death.  No guarantees given or implied. Patient and family thoughtfully acknowledges risks and wishes to proceed with total hip replacement.      Tanner Anders MD    Code Status    Full Code    Reason for Consult   Reason for consult: I was asked by Dr. Esquivel to evaluate this patient for right hip fracture.    Primary Care Physician   Nini Morrow    History of Present Illness   Terra Bolanos is a 81 year old female with right displaced femoral neck fracture.  PMH includes: atherosclerotic vascular disease, non-insulin-requiring diabetes mellitus, hypertension and osteopenia.  Came from home as unfortunately she had a mechanical fall while she was walking to answer her phone when she stubbed her toe that led for her to fall landing in a sitting position.  She denies any loss of consciousness or head injury.  Hip pain and unable to ambulate.    MEDS:   No current outpatient medications on file.       PAST MEDICAL HISTORY:   Past Medical History:   Diagnosis Date     Diabetes (H)      PONV (postoperative nausea and vomiting)        PAST SURGICAL HISTORY:   Past Surgical History:   Procedure Laterality Date     CHOLECYSTECTOMY       EYE SURGERY       HUMERUS FRACTURE SURGERY Right 1996    Metal plate in right arm     PHACOEMULSIFICATION CLEAR CORNEA WITH STANDARD INTRAOCULAR LENS IMPLANT Right 6/16/2015    Procedure: PHACOEMULSIFICATION OF  CATARACT WITH MULTIFOCAL INTRAOCULAR LENS IMPLANT, RIGHT EYE postponed till later in morning;  Surgeon: Orlando Reese MD;  Location: [facility];  Service:        FAMILY HISTORY: History reviewed. No pertinent family history.    SOCIAL HISTORY:   Social History     Tobacco Use     Smoking status: Former Smoker     Quit date: 1971     Years since quittin.4     Smokeless tobacco: Never Used   Substance Use Topics     Alcohol use: No       ALLERGIES:    Allergies   Allergen Reactions     Atorvastatin Other (See Comments)     Stiffness, pt is currently taking and denies allergy       ROS:  10 point ROS neg other than the symptoms noted above in the HPI.      Physical Exam   Temp: 98.5  F (36.9  C) Temp src: Temporal BP: 139/61 Pulse: 57   Resp: 8 SpO2: 98 % O2 Device: Nasal cannula Oxygen Delivery: 2 LPM  Vital Signs with Ranges  Temp:  [97.7  F (36.5  C)-100.8  F (38.2  C)] 98.5  F (36.9  C)  Pulse:  [56-79] 57  Resp:  [8-19] 8  BP: (134-158)/() 139/61  SpO2:  [85 %-99 %] 98 %  162 lbs 0 oz    Constitutional: Pleasant, alert, appropriate, following commands.  HEENT: Head atraumatic normocephalic. Pupils equal round and reactive to light.  Respiratory: Unlabored breathing no audible wheeze  Cardiovascular: Regular rate and rhythm  GI: Abdomen soft nontender nondistended.  Lymph/Hematologic: No lymphadenopathy in areas examined  Genitourinary:  No reese  Skin: No rashes, no cyanosis, no edema.  Musculoskeletal: RLE - shortened and externally rotated.  NVI.  BUE and LUE painless rom.  Neurologic: normal without focal findings, mental status, speech normal, alert and oriented x iii, LEON, reflexes normal and symmetric    X-rays - displaced right femoral neck fracture.    Data   Results for orders placed or performed during the hospital encounter of 21 (from the past 24 hour(s))   CBC with platelets differential    Narrative    The following orders were created for panel order CBC with platelets  differential.  Procedure                               Abnormality         Status                     ---------                               -----------         ------                     CBC with platelets and d...[[id number]]  Abnormal            Final result                 Please view results for these tests on the individual orders.   INR   Result Value Ref Range    INR 0.92 0.85 - 1.15   Basic metabolic panel   Result Value Ref Range    Sodium 141 133 - 144 mmol/L    Potassium 3.3 (L) 3.4 - 5.3 mmol/L    Chloride 106 94 - 109 mmol/L    Carbon Dioxide (CO2) 28 20 - 32 mmol/L    Anion Gap 7 3 - 14 mmol/L    Urea Nitrogen 16 7 - 30 mg/dL    Creatinine 0.64 0.52 - 1.04 mg/dL    Calcium 8.8 8.5 - 10.1 mg/dL    Glucose 178 (H) 70 - 99 mg/dL    GFR Estimate 84 >60 mL/min/1.73m2   CBC with platelets and differential   Result Value Ref Range    WBC Count 15.7 (H) 4.0 - 11.0 10e3/uL    RBC Count 4.43 3.80 - 5.20 10e6/uL    Hemoglobin 13.8 11.7 - 15.7 g/dL    Hematocrit 42.4 35.0 - 47.0 %    MCV 96 78 - 100 fL    MCH 31.2 26.5 - 33.0 pg    MCHC 32.5 31.5 - 36.5 g/dL    RDW 12.5 10.0 - 15.0 %    Platelet Count 243 150 - 450 10e3/uL    % Neutrophils 88 %    % Lymphocytes 7 %    % Monocytes 4 %    % Eosinophils 0 %    % Basophils 0 %    % Immature Granulocytes 1 %    NRBCs per 100 WBC 0 <1 /100    Absolute Neutrophils 13.6 (H) 1.6 - 8.3 10e3/uL    Absolute Lymphocytes 1.2 0.8 - 5.3 10e3/uL    Absolute Monocytes 0.6 0.0 - 1.3 10e3/uL    Absolute Eosinophils 0.1 0.0 - 0.7 10e3/uL    Absolute Basophils 0.1 0.0 - 0.2 10e3/uL    Absolute Immature Granulocytes 0.2 (H) <=0.0 10e3/uL    Absolute NRBCs 0.0 10e3/uL   Hemoglobin A1c   Result Value Ref Range    Hemoglobin A1C 6.8 (H) 0.0 - 5.6 %   Magnesium   Result Value Ref Range    Magnesium 1.9 1.6 - 2.3 mg/dL   XR Pelvis and Hip Right 2 Views    Narrative    EXAM: XR PELVIS AND HIP RIGHT 2 VIEWS  LOCATION: [facility]  DATE/TIME: 11/13/2021 8:19  PM    INDICATION: Fall, hip Pain  COMPARISON: None.      Impression    IMPRESSION: Acute fracture of the right femoral neck with lateral and superior displacement of the distal femur. Resultant varus deformity of the right hip. Small medial free fracture fragment. Mild hypertrophic change in the right hip joint without   joint space narrowing. Osteopenia. Mild hypertrophic change in the left hip joint. Degenerative changes lower lumbar spine.   EKG 12-lead, tracing only   Result Value Ref Range    Systolic Blood Pressure  mmHg    Diastolic Blood Pressure  mmHg    Ventricular Rate 77 BPM    Atrial Rate 77 BPM    FL Interval 186 ms    QRS Duration 94 ms     ms    QTc 477 ms    P Axis 51 degrees    R AXIS -22 degrees    T Axis 26 degrees    Interpretation ECG       Sinus rhythm  Nonspecific ST abnormality  Abnormal ECG  No previous ECGs available     Asymptomatic COVID-19 Virus (Coronavirus) by PCR Nasopharyngeal    Specimen: Nasopharyngeal; Swab   Result Value Ref Range    SARS CoV2 PCR Negative Negative    Narrative    Testing was performed using the malena  SARS-CoV-2 & Influenza A/B Assay on the [employer]ena  [employer]ater[employer]  System.  This test should be ordered for the detection of SARS-COV-2 in individuals who meet SARS-CoV-2 clinical and/or epidemiological criteria. Test performance is unknown in asymptomatic patients.  This test is for in vitro diagnostic use under the FDA EUA for laboratories certified under CLIA to perform moderate and/or high complexity testing. This test has not been FDA cleared or approved.  A negative test does not rule out the presence of PCR inhibitors in the specimen or target RNA in concentration below the limit of detection for the assay. The possibility of a false negative should be considered if the patient's recent exposure or clinical presentation suggests COVID-19.  [facility] are certified under the Clinical Laboratory Improvement Amendments of 1988 (CLIA-88) as  qualified to perform moderate and/or high complexity laboratory testing.   XR Chest Port 1 View    Narrative    EXAM: XR CHEST PORT 1 VIEW  LOCATION: [facility]  DATE/TIME: 11/13/2021 9:21 PM    INDICATION: fall with injury, eval for trauma  COMPARISON: None.      Impression    IMPRESSION: Heart size and pulmonary vessels are normal. Very minimal blunting at left costophrenic angle, lungs otherwise clear. No fracture identified.   Glucose by meter   Result Value Ref Range    GLUCOSE BY METER POCT 185 (H) 70 - 99 mg/dL   Glucose by meter   Result Value Ref Range    GLUCOSE BY METER POCT 208 (H) 70 - 99 mg/dL   Potassium   Result Value Ref Range    Potassium 3.5 3.4 - 5.3 mmol/L   CBC with platelets differential    Narrative    The following orders were created for panel order CBC with platelets differential.  Procedure                               Abnormality         Status                     ---------                               -----------         ------                     CBC with platelets and d...[[id number]]  Abnormal            Final result                 Please view results for these tests on the individual orders.   Basic metabolic panel   Result Value Ref Range    Sodium 138 133 - 144 mmol/L    Potassium 3.2 (L) 3.4 - 5.3 mmol/L    Chloride 105 94 - 109 mmol/L    Carbon Dioxide (CO2) 28 20 - 32 mmol/L    Anion Gap 5 3 - 14 mmol/L    Urea Nitrogen 15 7 - 30 mg/dL    Creatinine 0.61 0.52 - 1.04 mg/dL    Calcium 8.8 8.5 - 10.1 mg/dL    Glucose 177 (H) 70 - 99 mg/dL    GFR Estimate 85 >60 mL/min/1.73m2   CBC with platelets and differential   Result Value Ref Range    WBC Count 9.4 4.0 - 11.0 10e3/uL    RBC Count 4.32 3.80 - 5.20 10e6/uL    Hemoglobin 13.4 11.7 - 15.7 g/dL    Hematocrit 41.3 35.0 - 47.0 %    MCV 96 78 - 100 fL    MCH 31.0 26.5 - 33.0 pg    MCHC 32.4 31.5 - 36.5 g/dL    RDW 12.8 10.0 - 15.0 %    Platelet Count 230 150 - 450 10e3/uL    % Neutrophils 80 %    %  Lymphocytes 13 %    % Monocytes 6 %    % Eosinophils 0 %    % Basophils 0 %    % Immature Granulocytes 1 %    NRBCs per 100 WBC 0 <1 /100    Absolute Neutrophils 7.4 1.6 - 8.3 10e3/uL    Absolute Lymphocytes 1.2 0.8 - 5.3 10e3/uL    Absolute Monocytes 0.6 0.0 - 1.3 10e3/uL    Absolute Eosinophils 0.0 0.0 - 0.7 10e3/uL    Absolute Basophils 0.0 0.0 - 0.2 10e3/uL    Absolute Immature Granulocytes 0.1 (H) <=0.0 10e3/uL    Absolute NRBCs 0.0 10e3/uL   Glucose by meter   Result Value Ref Range    GLUCOSE BY METER POCT 162 (H) 70 - 99 mg/dL   Glucose by meter   Result Value Ref Range    GLUCOSE BY METER POCT 156 (H) 70 - 99 mg/dL   Potassium   Result Value Ref Range    Potassium 4.3 3.4 - 5.3 mmol/L   Glucose by meter   Result Value Ref Range    GLUCOSE BY METER POCT 242 (H) 70 - 99 mg/dL

## 2021-11-15 NOTE — PROGRESS NOTES
11/15/21 1106   Quick Adds   Type of Visit Initial PT Evaluation   Living Environment   People in home alone   Current Living Arrangements house   Home Accessibility wheelchair accessible;no concerns   Living Environment Comments  was in WC, home has ramp and chair lift to lower level, all needs met on main   Self-Care   Usual Activity Tolerance excellent   Equipment Currently Used at Home none   Activity/Exercise/Self-Care Comment indep with moblity and ADLS, hasWW   Disability/Function   Hearing Difficulty or Deaf no   Wear Glasses or Blind no   Concentrating, Remembering or Making Decisions Difficulty no   Difficulty Communicating no   Difficulty Eating/Swallowing no   Walking or Climbing Stairs Difficulty no   Dressing/Bathing Difficulty no   Toileting issues no   Doing Errands Independently Difficulty (such as shopping) no   Fall history within last six months yes   Number of times patient has fallen within last six months 1   Change in Functional Status Since Onset of Current Illness/Injury yes   General Information   Onset of Illness/Injury or Date of Surgery 11/14/21   Referring Physician Deon Coker PA-C   Patient/Family Therapy Goals Statement (PT) return home with home PT   Pertinent History of Current Problem (include personal factors and/or comorbidities that impact the POC) s/p R MEGAN to repair R hip fracture sustained in fall   Existing Precautions/Restrictions fall;90 degree hip flexion;no pivoting or twisting;no active hip ABD;no hip ADD past midline;no hip ER   Weight-Bearing Status - RLE weight-bearing as tolerated   Cognition   Orientation Status (Cognition) oriented x 3   Affect/Mental Status (Cognition) WNL   Follows Commands (Cognition) WNL   Safety Deficit (Cognition) at risk behavior observed;ability to follow commands;safety precautions follow-through/compliance   Pain Assessment   Patient Currently in Pain Yes, see Vital Sign flowsheet   Posture    Posture Forward head  position;Protracted shoulders   Range of Motion (ROM)   ROM Comment Dec R hip ROM d/t pain/edema   Strength   Strength Comments R hip strength inhibited s/p MEGAN   Bed Mobility   Comment (Bed Mobility) CGA   Transfers   Transfer Safety Comments min A w/ WW R WBAT   Gait/Stairs (Locomotion)   Comment (Gait/Stairs) min A w/ WW R WBAT step to pattern, reduced gait speed   Balance   Balance Comments impaired s/p MEGAN   Clinical Impression   Criteria for Skilled Therapeutic Intervention yes, treatment indicated   PT Diagnosis (PT) s/p MEGAN   Influenced by the following impairments impaired gait, dec indep w/ transfers   Functional limitations due to impairments impaired functional mobility   Clinical Presentation Evolving/Changing   Clinical Presentation Rationale [facility] clinical assessment   Clinical Decision Making (Complexity) low complexity   Therapy Frequency (PT) 2x/day   Predicted Duration of Therapy Intervention (days/wks) 3 days   Planned Therapy Interventions (PT) bed mobility training;cryotherapy;gait training;strengthening;transfer training;home program guidelines;ROM (range of motion);stair training   Risk & Benefits of therapy have been explained evaluation/treatment results reviewed;care plan/treatment goals reviewed;risks/benefits reviewed   PT Discharge Planning    PT Discharge Recommendation (DC Rec) home with assist;home with home care physical therapy   PT Rationale for DC Rec Pt below baseline for mobility and ADLS s/p MEGAN, needing A x 1 presently anticipate pt to progress with mobility to SBA with WW, pt has involved family who could stay with patient if needed, pts home is  accessible, she will benefit from cont PT during stay with continued home PT at ID        11/15/21 1106   Quick Adds   Type of Visit Initial PT Evaluation   Living Environment   People in home alone   Current Living Arrangements house   Home Accessibility wheelchair accessible;no concerns   Living Environment Comments  was in  WC, home has ramp and chair lift to lower level, all needs met on main   Self-Care   Usual Activity Tolerance excellent   Equipment Currently Used at Home none   Activity/Exercise/Self-Care Comment indep with moblity and ADLS, hasWW   Disability/Function   Hearing Difficulty or Deaf no   Wear Glasses or Blind no   Concentrating, Remembering or Making Decisions Difficulty no   Difficulty Communicating no   Difficulty Eating/Swallowing no   Walking or Climbing Stairs Difficulty no   Dressing/Bathing Difficulty no   Toileting issues no   Doing Errands Independently Difficulty (such as shopping) no   Fall history within last six months yes   Number of times patient has fallen within last six months 1   Change in Functional Status Since Onset of Current Illness/Injury yes   General Information   Onset of Illness/Injury or Date of Surgery 11/14/21   Referring Physician Deon Coker PA-C   Patient/Family Therapy Goals Statement (PT) return home with home PT   Pertinent History of Current Problem (include personal factors and/or comorbidities that impact the POC) s/p R MEGAN to repair R hip fracture sustained in fall   Existing Precautions/Restrictions fall;90 degree hip flexion;no pivoting or twisting;no active hip ABD;no hip ADD past midline;no hip ER   Weight-Bearing Status - RLE weight-bearing as tolerated   Cognition   Orientation Status (Cognition) oriented x 3   Affect/Mental Status (Cognition) WNL   Follows Commands (Cognition) WNL   Safety Deficit (Cognition) at risk behavior observed;ability to follow commands;safety precautions follow-through/compliance   Pain Assessment   Patient Currently in Pain Yes, see Vital Sign flowsheet   Posture    Posture Forward head position;Protracted shoulders   Range of Motion (ROM)   ROM Comment Dec R hip ROM d/t pain/edema   Strength   Strength Comments R hip strength inhibited s/p MEGAN   Bed Mobility   Comment (Bed Mobility) CGA   Transfers   Transfer Safety Comments min A w/  WW R WBAT   Gait/Stairs (Locomotion)   Comment (Gait/Stairs) min A w/ WW R WBAT step to pattern, reduced gait speed   Balance   Balance Comments impaired s/p MEGAN   Clinical Impression   Criteria for Skilled Therapeutic Intervention yes, treatment indicated   PT Diagnosis (PT) s/p MEGAN   Influenced by the following impairments impaired gait, dec indep w/ transfers   Functional limitations due to impairments impaired functional mobility   Clinical Presentation Evolving/Changing   Clinical Presentation Rationale [facility] clinical assessment   Clinical Decision Making (Complexity) low complexity   Therapy Frequency (PT) 2x/day   Predicted Duration of Therapy Intervention (days/wks) 3 days   Planned Therapy Interventions (PT) bed mobility training;cryotherapy;gait training;strengthening;transfer training;home program guidelines;ROM (range of motion);stair training   Risk & Benefits of therapy have been explained evaluation/treatment results reviewed;care plan/treatment goals reviewed;risks/benefits reviewed   PT Discharge Planning    PT Discharge Recommendation (DC Rec) home with assist;home with home care physical therapy   PT Rationale for DC Rec Pt below baseline for mobility and ADLS s/p MEGAN, needing A x 1 presently anticipate pt to progress with mobility to SBA with WW, pt has involved family who could stay with patient if needed, pts home is WC accessible, she will benefit from cont PT during stay with continued home PT at [facility]

## 2021-11-15 NOTE — ANESTHESIA POSTPROCEDURE EVALUATION
Patient: Terra Bolanos    Procedure: Procedure(s):  Right total hip arthroplasty       Diagnosis:Femoral neck fracture (H) [S72.009A]  Diagnosis Additional Information: No value filed.    Anesthesia Type:  General    Note:  Disposition: Inpatient   Postop Pain Control: Uneventful            Sign Out: Well controlled pain   PONV: No   Neuro/Psych: Uneventful            Sign Out: Acceptable/Baseline neuro status   Airway/Respiratory: Uneventful            Sign Out: Acceptable/Baseline resp. status   CV/Hemodynamics: Uneventful            Sign Out: Acceptable CV status; No obvious hypovolemia; No obvious fluid overload   Other NRE: NONE   DID A NON-ROUTINE EVENT OCCUR? No           Last vitals:  Vitals Value Taken Time   /56 11/14/21 1945   Temp 98  F (36.7  C) 11/14/21 1945   Pulse 56 11/14/21 1951   Resp 13 11/14/21 1951   SpO2 95 % 11/14/21 1951   Vitals shown include unvalidated device data.    Electronically Signed By: Ronnie Hlae MD  November 14, 2021  7:53 PM

## 2021-11-16 ENCOUNTER — APPOINTMENT (OUTPATIENT)
Dept: PHYSICAL THERAPY | Facility: CLINIC | Age: 81
DRG: 522 | End: 2021-11-16
Payer: COMMERCIAL

## 2021-11-16 ENCOUNTER — APPOINTMENT (OUTPATIENT)
Dept: OCCUPATIONAL THERAPY | Facility: CLINIC | Age: 81
DRG: 522 | End: 2021-11-16
Payer: COMMERCIAL

## 2021-11-16 VITALS
TEMPERATURE: 97.5 F | DIASTOLIC BLOOD PRESSURE: 48 MMHG | WEIGHT: 162 LBS | SYSTOLIC BLOOD PRESSURE: 103 MMHG | HEART RATE: 76 BPM | BODY MASS INDEX: 29.81 KG/M2 | HEIGHT: 62 IN | RESPIRATION RATE: 16 BRPM | OXYGEN SATURATION: 94 %

## 2021-11-16 LAB
ANION GAP SERPL CALCULATED.3IONS-SCNC: 4 MMOL/L (ref 3–14)
BUN SERPL-MCNC: 18 MG/DL (ref 7–30)
CALCIUM SERPL-MCNC: 8.2 MG/DL (ref 8.5–10.1)
CHLORIDE BLD-SCNC: 104 MMOL/L (ref 94–109)
CO2 SERPL-SCNC: 28 MMOL/L (ref 20–32)
CREAT SERPL-MCNC: 0.61 MG/DL (ref 0.52–1.04)
ERYTHROCYTE [DISTWIDTH] IN BLOOD BY AUTOMATED COUNT: 12.8 % (ref 10–15)
GFR SERPL CREATININE-BSD FRML MDRD: 85 ML/MIN/1.73M2
GLUCOSE BLD-MCNC: 210 MG/DL (ref 70–99)
HCT VFR BLD AUTO: 31.6 % (ref 35–47)
HGB BLD-MCNC: 10.3 G/DL (ref 11.7–15.7)
MAGNESIUM SERPL-MCNC: 2.2 MG/DL (ref 1.6–2.3)
MCH RBC QN AUTO: 31.6 PG (ref 26.5–33)
MCHC RBC AUTO-ENTMCNC: 32.6 G/DL (ref 31.5–36.5)
MCV RBC AUTO: 97 FL (ref 78–100)
PLATELET # BLD AUTO: 162 10E3/UL (ref 150–450)
POTASSIUM BLD-SCNC: 3.8 MMOL/L (ref 3.4–5.3)
RBC # BLD AUTO: 3.26 10E6/UL (ref 3.8–5.2)
SODIUM SERPL-SCNC: 136 MMOL/L (ref 133–144)
WBC # BLD AUTO: 8 10E3/UL (ref 4–11)

## 2021-11-16 PROCEDURE — 250N000013 HC RX MED GY IP 250 OP 250 PS 637: Performed by: INTERNAL MEDICINE

## 2021-11-16 PROCEDURE — 99239 HOSP IP/OBS DSCHRG MGMT >30: CPT | Performed by: INTERNAL MEDICINE

## 2021-11-16 PROCEDURE — 36415 COLL VENOUS BLD VENIPUNCTURE: CPT | Performed by: INTERNAL MEDICINE

## 2021-11-16 PROCEDURE — 97116 GAIT TRAINING THERAPY: CPT | Mod: GP

## 2021-11-16 PROCEDURE — 80048 BASIC METABOLIC PNL TOTAL CA: CPT | Performed by: INTERNAL MEDICINE

## 2021-11-16 PROCEDURE — 97530 THERAPEUTIC ACTIVITIES: CPT | Mod: GP

## 2021-11-16 PROCEDURE — 83735 ASSAY OF MAGNESIUM: CPT | Performed by: INTERNAL MEDICINE

## 2021-11-16 PROCEDURE — 97535 SELF CARE MNGMENT TRAINING: CPT | Mod: GO | Performed by: REHABILITATION PRACTITIONER

## 2021-11-16 PROCEDURE — 250N000013 HC RX MED GY IP 250 OP 250 PS 637: Performed by: PHYSICIAN ASSISTANT

## 2021-11-16 PROCEDURE — 85027 COMPLETE CBC AUTOMATED: CPT | Performed by: INTERNAL MEDICINE

## 2021-11-16 RX ORDER — ASPIRIN 325 MG
325 TABLET, DELAYED RELEASE (ENTERIC COATED) ORAL DAILY
Qty: 30 TABLET | Refills: 0 | Status: SHIPPED | OUTPATIENT
Start: 2021-11-16

## 2021-11-16 RX ORDER — AMOXICILLIN 250 MG
1 CAPSULE ORAL 2 TIMES DAILY PRN
Qty: 20 TABLET | Refills: 0 | Status: SHIPPED | OUTPATIENT
Start: 2021-11-16

## 2021-11-16 RX ORDER — OXYCODONE HYDROCHLORIDE 5 MG/1
2.5-5 TABLET ORAL EVERY 4 HOURS PRN
Qty: 25 TABLET | Refills: 0 | Status: SHIPPED | OUTPATIENT
Start: 2021-11-16

## 2021-11-16 RX ORDER — ACETAMINOPHEN 325 MG/1
650 TABLET ORAL EVERY 6 HOURS PRN
Qty: 30 TABLET | Refills: 0 | Status: SHIPPED | OUTPATIENT
Start: 2021-11-16

## 2021-11-16 RX ADMIN — ASPIRIN 325 MG: 325 TABLET, COATED ORAL at 08:24

## 2021-11-16 RX ADMIN — ATENOLOL 50 MG: 50 TABLET ORAL at 08:25

## 2021-11-16 RX ADMIN — CHLORTHALIDONE 12.5 MG: 25 TABLET ORAL at 08:26

## 2021-11-16 RX ADMIN — ACETAMINOPHEN 650 MG: 325 TABLET, FILM COATED ORAL at 01:27

## 2021-11-16 RX ADMIN — LISINOPRIL 10 MG: 10 TABLET ORAL at 08:25

## 2021-11-16 RX ADMIN — METFORMIN HYDROCHLORIDE 1000 MG: 500 TABLET, FILM COATED ORAL at 08:24

## 2021-11-16 ASSESSMENT — ACTIVITIES OF DAILY LIVING (ADL)
ADLS_ACUITY_SCORE: 4
ADLS_ACUITY_SCORE: 6
ADLS_ACUITY_SCORE: 4
ADLS_ACUITY_SCORE: 4
ADLS_ACUITY_SCORE: 6
ADLS_ACUITY_SCORE: 6
ADLS_ACUITY_SCORE: 4
ADLS_ACUITY_SCORE: 4
ADLS_ACUITY_SCORE: 6

## 2021-11-16 NOTE — PLAN OF CARE
Patient vital signs are at baseline: Yes  Patient able to ambulate as they were prior to admission or with assist devices provided by therapies during their stay:  Yes-Ax1, using gait belt, and walker.  Patient MUST void prior to discharge:  Yes  Patient able to tolerate oral intake:  Yes  Pain has adequate pain control using Oral analgesics:  Yes-Denies pain.    Pt A&O x4. CMS intact. Dressing CDI.    Reviewed discharge instructions and medications with patient and son. Questions answered. Patient discharged to home via son with, discharge instructions, filled medications (tylenol, aspirin, oxycodone, and senna), and belongings at this time.

## 2021-11-16 NOTE — DISCHARGE INSTRUCTIONS
Your home care referral was sent to [facility]  If you haven't heard from them within the next 24-48 hours,  Please call them at [phone]

## 2021-11-16 NOTE — PLAN OF CARE
Physical Therapy Discharge Summary    Reason for therapy discharge:    Discharged to home with home therapy.    Progress towards therapy goal(s). See goals on Care Plan in [facility] electronic health record for goal details.  Goals met    Therapy recommendation(s):    Continued therapy is recommended.  Rationale/Recommendations:  HHPT to progress strength, balance, IND with mobility as pt below baseline at this time .

## 2021-11-16 NOTE — PROGRESS NOTES
Care Transitions Team: Following for CC, discharge planning, and disposition.       Per [facility] Trauma Liaison Handoff:   Writer spoke with Terra via phone introduced myself and explained my role in her plan of care. She is agreeable to HC and requested referral be sent to [place] or home care that her insurance covers.     Living situation:   Home environment:    Stairs-had rails? Ramp & Chair lift    Equipment available : 2WW     Prior Function level:   Ambulation Independent    ADL's Independent    Current home care services: None    Family/patient discharge goal: Return home with Home Care therapy    Barriers to Discharge:  Medically stable   Discharge Plan of care:  [facility] - PT/OT/RN    Orders needed for services: Post Op Plan of Care - Home with [facility] - PT/OT/RN       Will follow in collaboration with [facility] ASHLEE Moy [phone] [facility] for discharge planning.

## 2021-11-16 NOTE — PROGRESS NOTES
Orthopedic Surgery  Terra Bolanos  2021  Admit Date:  2021  POD: 2 Days Post-Op   Procedure(s):  Right total hip arthroplasty, posterior lateral piriformis sparing approach    Alert and oriented to person, place, and time.  Patient resting comfortably in bed.    Pain controlled.  Tolerating oral intake.    Denies nausea or vomiting  Denies chest pain or shortness of breath    Vital Sign Ranges  Temperature Temp  Av.1  F (36.7  C)  Min: 97.5  F (36.4  C)  Max: 98.8  F (37.1  C)   Blood pressure Systolic (24hrs), Av , Min:90 , Max:109        Diastolic (24hrs), Av, Min:42, Max:49      Pulse Pulse  Av.2  Min: 68  Max: 76   Respirations Resp  Avg: 15.6  Min: 14  Max: 16   Pulse oximetry SpO2  Av.6 %  Min: 92 %  Max: 95 %       Dressing is clean, dry, and intact.   Minimal erythema of the surrounding skin.   Bilateral calves are soft, non-tender.  Right lower extremity is NVI.  Sensation intact bilateral lower extremities  Patient able to resist dorsi and plantar flexion bilaterally  +Dp pulse    Labs:  Recent Labs   Lab Test 21  0556 11/15/21  0806 21  1450   POTASSIUM 3.8 4.1 4.3     Recent Labs   Lab Test 21  0556 11/15/21  0806 21  0744   HGB 10.3* 11.3* 13.4     Recent Labs   Lab Test 21  1922   INR 0.92     Recent Labs   Lab Test 21  0556 11/15/21  0806 21  0744    185 230     1. PLAN:              Continue ASA for DVT prophylaxis.                Mobilize with PT/OT, anterior precautions.               WBAT Right LE with walker.                Continue current pain regiment.              Dressings: Keep intact.  Change if >60% saturated     2. Disposition              Anticipate d/c to home with home care today if able pending medical clearance and PT.  Ortho stable.     Joycelyn Ramos PA-C

## 2021-11-16 NOTE — DISCHARGE SUMMARY
[facility]  Discharge Summary Hospitalist    Date of Admission:  11/13/2021  Date of Discharge:  11/16/2021  Discharging Provider: Mariama Bennett DO  Date of Service (when I saw the patient): 11/16/21    Discharge Diagnoses   Right femoral neck fracture s/p right total hip arthroplasty secondary to mechanical fall with osteopenia   Acute blood loss anemia  Stress-induced leukocytosis  Hypokalemia  Non-insulin-dependent diabetes mellitus  Hypertension  Hyperlipidemia  Coronary artery disease  Myocardial infarction status post PCI 2020    History of Present Illness   Terra Bolanos is a 81 year old female with known history of atherosclerotic vascular disease, non-insulin-requiring diabetes mellitus, hypertension and osteopenia who presented here at the emergency room coming from her home as unfortunately she apparently had a mechanical fall while she was walking to answer her phone when she stubbed her toe that led for her to fall landing in a sitting position.      Hospital Course   Terra Bolanos was admitted on 11/13/2021.  The following problems were addressed during her hospitalization:    Right femoral neck fracture s/p right total hip arthroplasty secondary to mechanical fall with osteopenia: Patient had fall at home resulting in right femoral neck fracture.  Imaging confirmed right acute fracture of the right femoral neck with lateral and superior displacement of the distal femur.  Orthopedic surgery was consulted and patient was taken for surgery on 11/14/2021.  Patient had a right total hip arthroplasty and tolerated the procedure well.  Postoperatively patient progressed with therapy.  DVT prophylaxis per orthopedic surgery recommendations.  Ambulation/activity per orthopedic surgery recommendations.  Patient to follow-up with primary care and orthopedic surgery.  Patient was discharged home with home health services.    Mariama Bennett DO    Significant Results and Procedures   See  below    11/14: Right total hip arthroplasty    Pending Results   These results will be followed up by Ortho/PCP  Unresulted Labs Ordered in the Past 30 Days of this Admission     Date and Time Order Name Status Description    11/14/2021  5:50 PM Surgical Pathology Exam In process         Code Status  Full Code       Primary Care Physician   Nini JOSETTE Jorikrista    Physical Exam   Temp: 97.5  F (36.4  C) Temp src: Temporal BP: 103/48 Pulse: 76   Resp: 16 SpO2: 94 % O2 Device: None (Room air)    Vitals:    11/13/21 2126   Weight: 73.5 kg (162 lb)     Vital Signs with Ranges  Temp:  [97.5  F (36.4  C)-98.8  F (37.1  C)] 97.5  F (36.4  C)  Pulse:  [68-76] 76  Resp:  [14-16] 16  BP: ()/(42-49) 103/48  SpO2:  [92 %-95 %] 94 %  I/O last 3 completed shifts:  In: 560 [P.O.:560]  Out: 1300 [Urine:1300]    Constitutional: Awake, alert, cooperative, no apparent distress. Non-toxic. Appears stated age.   HEENT: Atraumatic. Normocephalic. Conjunctiva non-injected. Sclera anicteric. MMM. No erythema or exudates of posterior oral pharynx.   Respiratory: Moves air bilaterally. Clear to auscultation bilaterally, no crackles or wheezing  Cardiovascular: Regular rate and rhythm, normal S1 and S2, and no murmur noted  GI: Normal bowel sounds, soft, non-distended, non-tender  Skin/Integumen: No rashes, no cyanosis, no edema. Pedal pulses 2/4 bilaterally.     Discharge Disposition   Discharged to home  Condition at discharge: Stable    Consultations This Hospital Stay   CARE MANAGEMENT / SOCIAL WORK IP CONSULT  PHYSICAL THERAPY ADULT IP CONSULT  OCCUPATIONAL THERAPY ADULT IP CONSULT  ORTHOPEDIC SURGERY IP CONSULT  PHYSICAL THERAPY ADULT IP CONSULT  OCCUPATIONAL THERAPY ADULT IP CONSULT    Time Spent on this Encounter   Mariama DELANEY DO, personally saw the patient today and spent greater than 30 minutes discharging this patient.    Discharge Orders      Home care nursing referral      Home Care PT Referral for Hospital Discharge       Reason for your hospital stay    Right hip fracture: Right MEGAN     Follow-up and recommended labs and tests    Follow-up with Dr Anders/Yessy SAMUEL at [facility] 2 weeks following your surgery.   To arrange appointment or questions call: the care coordinator at [phone]  [facility] phone number: [phone]  [facility] phone number: [phone]     Activity    Your activity upon discharge: WBAT Right LE with walker.  Rest as needed.     When to contact your care team    Call your provider if you have any of the following: temperature greater than 101,  increased shortness of breath, increased drainage, redness or increasing calf pain/cramping.     Wound care and dressings    Instructions to care for your wound at home: Keep wound clean and dry.  Keep incision covered.  Able to shower over aquacel or tegaderm dressing.  Do not immerse.     MD face to face encounter    Documentation of Face to Face and Certification for Home Health Services    I certify that patient: Terra Bolanos is under my care and that I, or a nurse practitioner or physician's assistant working with me, had a face-to-face encounter that meets the physician face-to-face encounter requirements with this patient on: 11/16/2021.    This encounter with the patient was in whole, or in part, for the following medical condition, which is the primary reason for home health care: Right hip fracture: Right MEGAN (anterior precautions).    I certify that, based on my findings, the following services are medically necessary home health services: Nursing and Physical Therapy.    My clinical findings support the need for the above services because: Nurse is needed: For complex aftercare of surgical procedures because the patient needs instruction and cannot perform care on their own due to: weakness and imbalance., To assess incision/dressings and changes in medications or other medical regimen. and To provide caregiver training to assist with: wound  care, medication.   Physical therapy need for instruction in gait training, improve balance and strengthening  Further, I certify that my clinical findings support that this patient is homebound (i.e. absences from home require considerable and taxing effort and are for medical reasons or [employer] services or infrequently or of short duration when for other reasons) because: Requires assistance of another person or specialized equipment to access medical services because patient: Has prohibitive pain during ambulation. and Is unable to exit home safely on own due to: weakness, difficulty ambulating, imbalance.    Based on the above findings. I certify that this patient is confined to the home and needs intermittent skilled nursing care, physical therapy and/or speech therapy.  The patient is under my care, and I have initiated the establishment of the plan of care.  This patient will be followed by a physician who will periodically review the plan of care.  Physician/Provider to provide follow up care: Nini Morrow    Attending hospital physician (the Medicare certified [place] provider): Torres Esquivel MD  Physician Signature: See electronic signature associated with these discharge orders.  Date: 11/16/2021     Reason for your hospital stay    Hip fracture.     Follow-up and recommended labs and tests     Follow up with primary care provider, Nini Morrow, within 7 days for hospital follow- up.  No follow up labs or test are needed.  Follow up with Orthopedic Surgery as scheduled     Activity    Your activity upon discharge: activity as tolerated - follow instructions/restrictions provided by Orthopedic Surgery     Discharge Instructions    Anterior hip precautions. Once you complete  mg daily for 30 days return to ASA 81 mg daily.     Walker DME    : DME Documentation: Describe the reason for need to support medical necessity: Impaired gait status post hip surgery. I, the undersigned, certify that  the above prescribed supplies are medically necessary for this patient and is both reasonable and necessary in reference to accepted standards of medical practice in the treatment of this patient's condition and is not prescribed as a convenience.     Diet    Follow this diet upon discharge: Orders Placed This Encounter      Advance Diet as Tolerated: Regular Diet Adult     Diet    Follow this diet upon discharge: Orders Placed This Encounter      Advance Diet as Tolerated: Regular Diet Adult      Diet     Discharge Medications   Discharge Medication List as of 11/16/2021  1:55 PM      START taking these medications    Details   acetaminophen (TYLENOL) 325 MG tablet Take 2 tablets (650 mg) by mouth every 6 hours as needed for mild pain or other (and adjunct with moderate or severe pain or per patient request), Disp-30 tablet, R-0, E-Prescribe      oxyCODONE (ROXICODONE) 5 MG tablet Take 0.5-1 tablets (2.5-5 mg) by mouth every 4 hours as needed for moderate to severe pain, Disp-25 tablet, R-0, Local Print      senna-docusate (SENOKOT-S/PERICOLACE) 8.6-50 MG tablet Take 1 tablet by mouth 2 times daily as needed for constipation, Disp-20 tablet, R-0, E-Prescribe         CONTINUE these medications which have CHANGED    Details   aspirin (ASA) 325 MG EC tablet Take 1 tablet (325 mg) by mouth daily, Disp-30 tablet, R-0, E-Prescribe         CONTINUE these medications which have NOT CHANGED    Details   atenolol (TENORMIN) 50 MG tablet Take 50 mg by mouth daily, Historical      atorvastatin (LIPITOR) 40 MG tablet Take 40 mg by mouth daily, Historical      chlorthalidone (HYGROTON) 25 MG tablet Take 12.5 mg by mouth daily, Historical      lisinopril (PRINIVIL,ZESTRIL) 10 MG tablet [LISINOPRIL (PRINIVIL,ZESTRIL) 10 MG TABLET] Take 10 mg by mouth daily., Historical      metFORMIN (GLUCOPHAGE) 500 MG tablet [METFORMIN (GLUCOPHAGE) 500 MG TABLET] Take 500 mg by mouth 2 (two) times a day with meals., Historical      nitroGLYcerin  (NITROSTAT) 0.4 MG sublingual tablet Place 0.4 mg under the tongue every 5 minutes as needed for chest pain For chest pain place 1 tablet under the tongue every 5 minutes for 3 doses. If symptoms persist 5 minutes after 1st dose call 911., Historical           Allergies   Allergies   Allergen Reactions     Atorvastatin Other (See Comments)     Stiffness, pt is currently taking and denies allergy     Data   Most Recent 3 CBC's:Recent Labs   Lab Test 11/16/21  0556 11/15/21  0806 11/14/21  0744   WBC 8.0 11.9* 9.4   HGB 10.3* 11.3* 13.4   MCV 97 98 96    185 230      Most Recent 3 BMP's:  Recent Labs   Lab Test 11/16/21  0556 11/15/21  1231 11/15/21  0806 11/14/21  1905 11/14/21  1450 11/14/21  0745 11/14/21  0744     --  137  --   --   --  138   POTASSIUM 3.8  --  4.1  --  4.3  --  3.2*   CHLORIDE 104  --  105  --   --   --  105   CO2 28  --  28  --   --   --  28   BUN 18  --  19  --   --   --  15   CR 0.61  --  0.67  --   --   --  0.61   ANIONGAP 4  --  4  --   --   --  5   ILANA 8.2*  --  8.5  --   --   --  8.8   * 180* 180*   < >  --    < > 177*    < > = values in this interval not displayed.     Most Recent 2 LFT's:No lab results found.  Most Recent INR's and Anticoagulation Dosing History:  Anticoagulation Dose History     Recent Dosing and Labs Latest Ref Rng & Units 11/13/2021    INR 0.85 - 1.15 0.92        Most Recent 3 Troponin's:No lab results found.  Most Recent Cholesterol Panel:No lab results found.  Most Recent 6 Bacteria Isolates From Any Culture (See EPIC Reports for Culture Details):No lab results found.  Most Recent TSH, T4 and A1c Labs:  Recent Labs   Lab Test 11/13/21  1922   A1C 6.8*     Results for orders placed or performed during the hospital encounter of 11/13/21   XR Pelvis and Hip Right 2 Views    Narrative    EXAM: XR PELVIS AND HIP RIGHT 2 VIEWS  LOCATION: [facility]  DATE/TIME: 11/13/2021 8:19 PM    INDICATION: Fall, hip Pain  COMPARISON: None.       Impression    IMPRESSION: Acute fracture of the right femoral neck with lateral and superior displacement of the distal femur. Resultant varus deformity of the right hip. Small medial free fracture fragment. Mild hypertrophic change in the right hip joint without   joint space narrowing. Osteopenia. Mild hypertrophic change in the left hip joint. Degenerative changes lower lumbar spine.   XR Chest Port 1 View    Narrative    EXAM: XR CHEST PORT 1 VIEW  LOCATION: [facility]  DATE/TIME: 11/13/2021 9:21 PM    INDICATION: fall with injury, eval for trauma  COMPARISON: None.      Impression    IMPRESSION: Heart size and pulmonary vessels are normal. Very minimal blunting at left costophrenic angle, lungs otherwise clear. No fracture identified.   XR Pelvis w Hip Port Right 1 View    Narrative    EXAM: XR PELVIS AD HIP PORTABLE RIGHT 1 VIEW  LOCATION: [facility]  DATE/TIME: 11/14/2021 6:48 PM    INDICATION: post op assessment  COMPARISON: 11/13/2021      Impression    IMPRESSION: Right MEGAN with cerclage wire fixation has been performed in the interval. No fractures are evident. Postoperative air is present. Skin staples are in place. Osteopenia.

## 2021-11-16 NOTE — PLAN OF CARE
Occupational Therapy Discharge Summary    Reason for therapy discharge:    Discharged to home.    Progress towards therapy goal(s). See goals on Care Plan in [facility] electronic health record for goal details.  Goals partially met.  Barriers to achieving goals:   discharge from facility.    Therapy recommendation(s):    Continued therapy is recommended.  Rationale/Recommendations:  Will benefit from continued therapy in home setting to progress activity tolerance and home care for increased ind.

## 2021-11-16 NOTE — CONSULTS
Care Management Discharge Note    Discharge Date: 11/16/2021       Discharge Disposition: Home Care    Discharge Services: RN/PT         Discharge Transportation: family or friend will provide      Additional Information:  Following in collaboration with FORTUNATO Dominguez. ROSS updated with [facility] contact information.      Griselda Hines RN, BSN CTS  Care Coordinator  [facility]   [phone]

## 2021-11-16 NOTE — PLAN OF CARE
Patient vital signs are at baseline: Yes on room air  Patient able to ambulate as they were prior to admission or with assist devices provided by therapies during their stay:  Yes; Ax1 w/ GBW  Patient MUST void prior to discharge:  Yes  Patient able to tolerate oral intake:  Yes on regular diet  Pain has adequate pain control using Oral analgesics:  Yes; requested 1000 mg tylenol for muscle soreness on L side.    Pt Ox4. CMS intact. Dressings CDI. Plan to discharge home today with home PT and OT. Will continue to monitor.

## 2021-11-17 ENCOUNTER — PATIENT OUTREACH (OUTPATIENT)
Dept: CARE COORDINATION | Facility: CLINIC | Age: 81
End: 2021-11-17
Payer: COMMERCIAL

## 2021-11-17 DIAGNOSIS — Z71.89 OTHER SPECIFIED COUNSELING: ICD-10-CM

## 2021-11-17 LAB
PATH REPORT.COMMENTS IMP SPEC: NORMAL
PATH REPORT.COMMENTS IMP SPEC: NORMAL
PATH REPORT.FINAL DX SPEC: NORMAL
PATH REPORT.GROSS SPEC: NORMAL
PATH REPORT.MICROSCOPIC SPEC OTHER STN: NORMAL
PATH REPORT.RELEVANT HX SPEC: NORMAL
PHOTO IMAGE: NORMAL

## 2021-11-17 PROCEDURE — 88311 DECALCIFY TISSUE: CPT | Mod: 26 | Performed by: PATHOLOGY

## 2021-11-17 PROCEDURE — 88305 TISSUE EXAM BY PATHOLOGIST: CPT | Mod: 26 | Performed by: PATHOLOGY

## 2021-11-17 NOTE — PROGRESS NOTES
"Clinic Care Coordination Contact  [facility]: Post-Discharge Note  SITUATION                                                      Admission:    Admission Date: 11/13/21   Reason for Admission: Right femoral neck fracture s/p right total hip arthroplasty secondary to mechanical fall with osteopenia  Discharge:   Discharge Date: 11/16/21  Discharge Diagnosis: Right femoral neck fracture s/p right total hip arthroplasty secondary to mechanical fall with osteopenia    BACKGROUND                                                      Terra Bolanos is a 81 year old female with known history of atherosclerotic vascular disease, non-insulin-requiring diabetes mellitus, hypertension and osteopenia who presented here at the emergency room coming from her home as unfortunately she apparently had a mechanical fall while she was walking to answer her phone when she stubbed her toe that led for her to fall landing in a sitting position.  She denies any loss of consciousness or head injury and she tried to lift herself up into a chair and ambulate with an aid of her cane but was not able to bear weight on it and has to lower down herself on the ground.  Fortunately she was not complaining of severe hip pain.    ASSESSMENT      Enrollment  Primary Care Care Coordination Status: Not a Candidate    Discharge Assessment  How are you doing now that you are home?: \"I'm feeling really good actually\"  How are your symptoms? (Red Flag symptoms escalate to triage hotline per guidelines): Improved  Do you feel your condition is stable enough to be safe at home until your provider visit?: Yes  Does the patient have their discharge instructions? : Yes  Does the patient have questions regarding their discharge instructions? : No  Were you started on any new medications or were there changes to any of your previous medications? : Yes  Does the patient have all of their medications?: Yes  Do you have questions regarding any of your " medications? : No  Do you have all of your needed medical supplies or equipment (DME)?  (i.e. oxygen tank, CPAP, cane, etc.): Yes  Discharge follow-up appointment scheduled within 14 calendar days? : Yes  Discharge Follow Up Appointment Date: 11/18/21  Discharge Follow Up Appointment Scheduled with?: Specialty Care Provider                  PLAN                                                      Outpatient Plan: Follow-up with Dr Anders/Yessy SAMUEL at [facility] 2 weeks following your surgery.   To arrange appointment or questions call: the care coordinator at [phone]  [facility] phone number: [phone]  [facility] phone number: [phone]    No future appointments.      For any urgent concerns, please contact our 24 hour nurse triage line: [phone] ([phone])         Ninfa Fierro  Community Health Worker  [facility]  [phone]

## 2021-12-25 ENCOUNTER — HOSPITAL ENCOUNTER (EMERGENCY)
Facility: CLINIC | Age: 81
Discharge: HOME | End: 2021-12-25
Attending: EMERGENCY MEDICINE | Admitting: EMERGENCY MEDICINE
Payer: COMMERCIAL

## 2021-12-25 VITALS
HEART RATE: 93 BPM | TEMPERATURE: 98.6 F | SYSTOLIC BLOOD PRESSURE: 159 MMHG | RESPIRATION RATE: 20 BRPM | WEIGHT: 155 LBS | DIASTOLIC BLOOD PRESSURE: 88 MMHG | OXYGEN SATURATION: 94 % | BODY MASS INDEX: 28.35 KG/M2

## 2021-12-25 DIAGNOSIS — R04.0 EPISTAXIS: ICD-10-CM

## 2021-12-25 PROCEDURE — 99282 EMERGENCY DEPT VISIT SF MDM: CPT

## 2021-12-25 RX ORDER — OXYMETAZOLINE HYDROCHLORIDE 0.05 G/100ML
SPRAY NASAL
Status: DISCONTINUED
Start: 2021-12-25 | End: 2021-12-25 | Stop reason: HOSPADM

## 2021-12-26 ASSESSMENT — ENCOUNTER SYMPTOMS
SHORTNESS OF BREATH: 0
VOMITING: 0
NAUSEA: 0
FEVER: 0

## 2021-12-26 NOTE — DISCHARGE INSTRUCTIONS
IF BLEEDING RECURS:  SQUIRT TWO SPRAYS OF OXYMETAZOLINE IN THE SIDE OF THE NOSE THAT IS BLEEDING.  PINCH THE NOSE AND HOLD PRESSURE FOR 20-30 MINUTES    Sinus Precautions:    FOR 2-3 DAYS AVOID:  ? placing anything in your nose (Q-tip, tissue, finger) other than vaseline ointment at the opening of the nose.  ? blowing your nose  It is best to wipe away nasal secretions carefully.   ? sneezing  If you must sneeze, keep your mouth open and do not pinch your nose closed.  ? sucking  Do not drink through a straw. Do not smoke.  ? blowing  Do not play a wind instrument. Do not blow up balloons.

## 2021-12-26 NOTE — ED TRIAGE NOTES
Pt arrives to the ED due to a nose bleed that began around 20 min prior to arrival in the right nostril. Nose bleed appears to have stopped. States she coughed up 3 large clots. Pt denies any use of blood thinners. Denies SOB.

## 2021-12-26 NOTE — ED PROVIDER NOTES
History   Chief Complaint:  Epistaxis    The history is provided by the patient.      Terra Bolanos is a 81 year old female with history of diabetes who presents with epistaxis. She was sitting down when she developed a sudden right sided nose bleed. She also coughed up 3 large clots of blood. This is her second nose bleed since the 20th of November. Her last episode did not last as long and she was able to stop the bleeding on her own at home. She denies any trauma or nasal injury. Patient is not anticoagulated. The current episode of bleeding stopped prior to arrival. She denies any bleeding elsewhere. She is otherwise feeling well and denies any other symptoms.    Review of Systems   Constitutional: Negative for fever.   HENT: Positive for nosebleeds. Negative for congestion.    Respiratory: Negative for shortness of breath.    Cardiovascular: Negative for chest pain.   Gastrointestinal: Negative for nausea and vomiting.   Neurological: Negative for syncope.   All other systems reviewed and are negative.        Allergies:  Atorvastatin    Medications:  acetaminophen   aspirin   atenolol   atorvastatin   chlorthalidone   lisinopril   metFORMIN   nitroGLYcerin   oxycodone   senna-docusate     Past Medical History:     Diabetes  PONV  Type 2 diabetes  HLD  Hypertension  NSTEMI  ASCVD (arteriosclerotic cardiovascular disease)    Past Surgical History:    CHOLECYSTECTOMY  EYE SURGERY  ARTHROPLASTY HIP    Family History:    Heart attack  Heart failure    Social History:  Presents with son  PCP: Nini Morrow    Physical Exam     Patient Vitals for the past 24 hrs:   BP Temp Temp src Pulse Resp SpO2 Weight   12/25/21 1959 (!) 159/88 98.6  F (37  C) Oral 93 20 94 % 70.3 kg (155 lb)       Physical Exam  General: Resting comfortably  Head:  Scalp, face, and head appear normal  Eyes:  Pupils equal, round, and reactive to light    Conjunctivae noninjected and sclera white  ENT:    The nose is normal.  Right nasopharynx  with evidence of recent epistaxis.  No ongoing or active epistaxis.  The right nasal septal mucosa is hyperemic and appears friable.  No septal perforation or hematoma.  The right turbinates appear mildly hyperemic but otherwise normal.  Left nasopharynx appears normal without evidence of any bleeding.  Posterior pharynx is clear without any posterior pharyngeal bleeding, swelling, exudates or other abnormality.    Ears/pinnae are normal  Neck:  Normal range of motion  CV:  RRR, no M/R/G  Resp:  CTAB, no increased WOB   MSK:  Normal tone.   Skin:  No rash or lesions noted.  Neuro: Speech is normal and fluent    Moves all extremities spontaneously  Psych:  Awake, Alert. Normal affect      Appropriate interactions           Emergency Department Course     Laboratory:  Labs Ordered and Resulted from Time of ED Arrival to Time of ED Departure - No data to display     Emergency Department Course:    Reviewed:  I reviewed nursing notes, vitals, past medical history and Care Everywhere    Assessments:  2023 I obtained history and examined the patient as noted above.    I rechecked the patient and explained findings.     Interventions:  Afrin nasal spray    Disposition:  The patient was discharged to home.     Impression & Plan     Medical Decision Making:  Terra Bolanos is a 81 year old female who presents to the ED after an episode of epistaxis. Bleeding stopped PTA. Right nasal septal mucosa is erythematous and friable.  No active bleeding seen in the ED.  Afrin was sprayed into the right nare and patient was observed in the emergency department, and was not noted to have recurrence of their epistaxis.  No evidence of any posterior bleeding.  Patient has remained hemodynamically stable with no development of respiratory compromise. Patient denies any other bleeding or symptoms to suggest any systemic coagulopathy or other serious systemic condition.  Patient safe for discharge at this time.  Supportive care for home  provided.  Patient sent home with Afrin in the event that bleeding recurs.  I recommended Vaseline ointment to the bilateral nasal openings as well as considering humidifier in the bedroom at night.  Outpatient cares were discussed including avoidance of nose blowing for 12 hours, avoidance of local nasal trauma, avoidance of heavy lifting, and ways to control bleeding should bleeding recur.  Strict return precautions were discussed including immediate return with any recurrent bleeding not stopping at home, development of severe pain, dizziness/lightheadednss, syncope, shortness of breath, or any other new or troubling symptoms.  Patient felt comfortable with this plan of care and all questions were answered prior to discharge.  Patient discharged in stable condition.    Diagnosis:    ICD-10-CM    1. Epistaxis  R04.0        Discharge Medications:  Discharge Medication List as of 12/25/2021  8:59 PM          Scribe Disclosure:  I, Jason Beal, am serving as a scribe at 8:23 PM on 12/25/2021 to document services personally performed by Tanner Hernández MD based on my observations and the provider's statements to me.              Tanner Hernández MD  12/26/21 1137

## 2022-02-19 ENCOUNTER — HEALTH MAINTENANCE LETTER (OUTPATIENT)
Age: 82
End: 2022-02-19

## 2022-04-16 ENCOUNTER — HEALTH MAINTENANCE LETTER (OUTPATIENT)
Age: 82
End: 2022-04-16

## 2022-06-11 ENCOUNTER — HEALTH MAINTENANCE LETTER (OUTPATIENT)
Age: 82
End: 2022-06-11

## 2022-10-16 ENCOUNTER — HEALTH MAINTENANCE LETTER (OUTPATIENT)
Age: 82
End: 2022-10-16

## 2023-04-01 ENCOUNTER — HEALTH MAINTENANCE LETTER (OUTPATIENT)
Age: 83
End: 2023-04-01

## 2023-06-01 ENCOUNTER — HEALTH MAINTENANCE LETTER (OUTPATIENT)
Age: 83
End: 2023-06-01

## 2023-08-26 ENCOUNTER — HEALTH MAINTENANCE LETTER (OUTPATIENT)
Age: 83
End: 2023-08-26

## 2024-01-13 ENCOUNTER — HEALTH MAINTENANCE LETTER (OUTPATIENT)
Age: 84
End: 2024-01-13

## 2024-06-01 ENCOUNTER — HEALTH MAINTENANCE LETTER (OUTPATIENT)
Age: 84
End: 2024-06-01

## 2024-08-10 ENCOUNTER — HEALTH MAINTENANCE LETTER (OUTPATIENT)
Age: 84
End: 2024-08-10

## 2024-10-19 ENCOUNTER — HEALTH MAINTENANCE LETTER (OUTPATIENT)
Age: 84
End: 2024-10-19

## 2025-04-11 NOTE — PROGRESS NOTES
Orthopedic Surgery  Terra Bolanos  11/15/2021  Admit Date:  2021  POD: 1 Day Post-Op   Procedure(s):  Right total hip arthroplasty, posterior lateral piriformis sparing approach    Alert and oriented to person, place, and time.  Patient resting comfortably in bed.    Pain controlled.  Tolerating oral intake.    Denies nausea or vomiting  Denies chest pain or shortness of breath    Vital Sign Ranges  Temperature Temp  Av.9  F (36.6  C)  Min: 96.5  F (35.8  C)  Max: 100.8  F (38.2  C)   Blood pressure Systolic (24hrs), Av , Min:90 , Max:161        Diastolic (24hrs), Av, Min:44, Max:112      Pulse Pulse  Av.2  Min: 51  Max: 72   Respirations Resp  Av.5  Min: 8  Max: 19   Pulse oximetry SpO2  Av.1 %  Min: 85 %  Max: 100 %       Dressing is clean, dry, and intact.   Minimal erythema of the surrounding skin.   Bilateral calves are soft, non-tender.  Right lower extremity is NVI.  Sensation intact bilateral lower extremities  Patient able to resist dorsi and plantar flexion bilaterally  +Dp pulse    Labs:  Recent Labs   Lab Test 11/15/21  0806 21  1450 21   POTASSIUM 4.1 4.3 3.2*     Recent Labs   Lab Test 11/15/21  0806 21  0744 21   HGB 11.3* 13.4 13.8     Recent Labs   Lab Test 21   INR 0.92     Recent Labs   Lab Test 11/15/21  0806 21  0744 21    230 243       1. PLAN:   Continue ASA for DVT prophylaxis.     Mobilize with PT/OT    WBAT Right LE with walker.     Continue current pain regiment.   Dressings: Keep intact.  Change if >60% saturated    2. Disposition   Anticipate d/c to home if able when medically cleared and progressing in PT.    Joycelyn Ramos PA-C     11-Apr-2025 20:56

## 2025-04-16 ENCOUNTER — APPOINTMENT (OUTPATIENT)
Age: 85
Setting detail: DERMATOLOGY
End: 2025-04-16

## 2025-04-16 DIAGNOSIS — D22 MELANOCYTIC NEVI: ICD-10-CM

## 2025-04-16 DIAGNOSIS — L57.0 ACTINIC KERATOSIS: ICD-10-CM

## 2025-04-16 DIAGNOSIS — L82.1 OTHER SEBORRHEIC KERATOSIS: ICD-10-CM

## 2025-04-16 DIAGNOSIS — Z85.828 PERSONAL HISTORY OF OTHER MALIGNANT NEOPLASM OF SKIN: ICD-10-CM

## 2025-04-16 DIAGNOSIS — Z71.89 OTHER SPECIFIED COUNSELING: ICD-10-CM

## 2025-04-16 DIAGNOSIS — D18.0 HEMANGIOMA: ICD-10-CM

## 2025-04-16 PROBLEM — D18.01 HEMANGIOMA OF SKIN AND SUBCUTANEOUS TISSUE: Status: ACTIVE | Noted: 2025-04-16

## 2025-04-16 PROBLEM — D22.5 MELANOCYTIC NEVI OF TRUNK: Status: ACTIVE | Noted: 2025-04-16

## 2025-04-16 PROCEDURE — ? SUNSCREEN RECOMMENDATIONS

## 2025-04-16 PROCEDURE — ? COUNSELING

## 2025-04-16 PROCEDURE — 99213 OFFICE O/P EST LOW 20 MIN: CPT

## 2025-04-16 PROCEDURE — ? OBSERVATION

## 2025-04-16 ASSESSMENT — LOCATION DETAILED DESCRIPTION DERM
LOCATION DETAILED: EPIGASTRIC SKIN
LOCATION DETAILED: SUPERIOR THORACIC SPINE
LOCATION DETAILED: RIGHT SUPERIOR MEDIAL MIDBACK
LOCATION DETAILED: LEFT SUPERIOR FOREHEAD
LOCATION DETAILED: MIDDLE STERNUM
LOCATION DETAILED: INFERIOR THORACIC SPINE
LOCATION DETAILED: RIGHT MEDIAL FOREHEAD
LOCATION DETAILED: LEFT INFERIOR MEDIAL FOREHEAD

## 2025-04-16 ASSESSMENT — LOCATION SIMPLE DESCRIPTION DERM
LOCATION SIMPLE: LEFT FOREHEAD
LOCATION SIMPLE: RIGHT FOREHEAD
LOCATION SIMPLE: RIGHT LOWER BACK
LOCATION SIMPLE: ABDOMEN
LOCATION SIMPLE: UPPER BACK
LOCATION SIMPLE: CHEST

## 2025-04-16 ASSESSMENT — LOCATION ZONE DERM
LOCATION ZONE: FACE
LOCATION ZONE: TRUNK

## 2025-04-16 NOTE — HPI: SKIN LESION (ACTINIC KERATOSES)
Called patient to schedule overdue PCP appt, Patient did not answer, left a voicemial      
Is This A New Presentation, Or A Follow-Up?: Follow Up Actinic Keratoses
Additional History: No actinic keratoses at the last visit.

## 2025-04-16 NOTE — PROCEDURE: OBSERVATION
Body Location Override (Optional - Billing Will Still Be Based On Selected Body Map Location If Applicable): right lateral neck, the left superior forehead, the right superior forehead, and the left postauricular area
Detail Level: Detailed
Size Of Lesion In Cm (Optional): 0

## (undated) DEVICE — GLOVE PROTEXIS BLUE W/NEU-THERA 7.5  2D73EB75

## (undated) DEVICE — DEVICE RETRIEVER HEWSON 71111579

## (undated) DEVICE — DRAPE CONVERTORS U-DRAPE 60X72" 8476

## (undated) DEVICE — SUCTION MANIFOLD NEPTUNE 2 SYS 4 PORT 0702-020-000

## (undated) DEVICE — GLOVE PROTEXIS POWDER FREE 8.0 ORTHOPEDIC 2D73ET80

## (undated) DEVICE — HOOD FLYTE W/PEELAWAY 408-800-100

## (undated) DEVICE — BAG CLEAR TRASH 1.3M 39X33" P4040C

## (undated) DEVICE — SOL WATER IRRIG 1000ML BOTTLE 2F7114

## (undated) DEVICE — SU STRATAFIX 2-0 MH 36" SXPD2B412

## (undated) DEVICE — GLOVE PROTEXIS POWDER FREE 7.5 ORTHOPEDIC 2D73ET75

## (undated) DEVICE — DRSG XEROFORM 1X8"

## (undated) DEVICE — SU ETHIBOND 5 V-37 4X30" MB66G

## (undated) DEVICE — ESU ELEC BLADE 6" COATED E1450-6

## (undated) DEVICE — SET HANDPIECE INTERPULSE W/COAXIAL FAN SPRAY TIP 0210118000

## (undated) DEVICE — LINEN HALF SHEET 5512

## (undated) DEVICE — DRSG GAUZE 4X4" TRAY

## (undated) DEVICE — LINEN FULL SHEET 5511

## (undated) DEVICE — LINEN ORTHO ACL PACK 5447

## (undated) DEVICE — PACK TOTAL HIP RIDGES LATEX PO15HIFSG

## (undated) DEVICE — SU VICRYL 2-0 CT-1 27" UND J259H

## (undated) DEVICE — GLOVE PROTEXIS BLUE W/NEU-THERA 8.0  2D73EB80

## (undated) DEVICE — LINEN DRAPE 54X72" 5467

## (undated) DEVICE — SU VICRYL+ 1 MO-4 18" DYED VCP702D

## (undated) DEVICE — DRAPE STERI TOWEL LG 1010

## (undated) DEVICE — PREP CHLORAPREP 26ML TINTED HI-LITE ORANGE 930815

## (undated) DEVICE — BLADE SAW SAGITTAL STRK 25X90X1.27MM HD SYS 6 6125-127-090

## (undated) DEVICE — SU VICRYL 0 CT-1 27" J340H

## (undated) DEVICE — SOL NACL 0.9% IRRIG 3000ML BAG 2B7477

## (undated) RX ORDER — DEXAMETHASONE SODIUM PHOSPHATE 4 MG/ML
INJECTION, SOLUTION INTRA-ARTICULAR; INTRALESIONAL; INTRAMUSCULAR; INTRAVENOUS; SOFT TISSUE
Status: DISPENSED
Start: 2021-11-14

## (undated) RX ORDER — TRANEXAMIC ACID 10 MG/ML
INJECTION, SOLUTION INTRAVENOUS
Status: DISPENSED
Start: 2021-11-14

## (undated) RX ORDER — LIDOCAINE HYDROCHLORIDE 10 MG/ML
INJECTION, SOLUTION EPIDURAL; INFILTRATION; INTRACAUDAL; PERINEURAL
Status: DISPENSED
Start: 2021-11-14

## (undated) RX ORDER — SCOLOPAMINE TRANSDERMAL SYSTEM 1 MG/1
PATCH, EXTENDED RELEASE TRANSDERMAL
Status: DISPENSED
Start: 2021-11-14

## (undated) RX ORDER — GLYCOPYRROLATE 0.2 MG/ML
INJECTION INTRAMUSCULAR; INTRAVENOUS
Status: DISPENSED
Start: 2021-11-14

## (undated) RX ORDER — FENTANYL CITRATE 50 UG/ML
INJECTION, SOLUTION INTRAMUSCULAR; INTRAVENOUS
Status: DISPENSED
Start: 2021-11-14

## (undated) RX ORDER — PROPOFOL 10 MG/ML
INJECTION, EMULSION INTRAVENOUS
Status: DISPENSED
Start: 2021-11-14

## (undated) RX ORDER — FENTANYL CITRATE-0.9 % NACL/PF 10 MCG/ML
PLASTIC BAG, INJECTION (ML) INTRAVENOUS
Status: DISPENSED
Start: 2021-11-14

## (undated) RX ORDER — EPHEDRINE SULFATE 50 MG/ML
INJECTION, SOLUTION INTRAMUSCULAR; INTRAVENOUS; SUBCUTANEOUS
Status: DISPENSED
Start: 2021-11-14

## (undated) RX ORDER — CEFAZOLIN SODIUM/WATER 2 G/20 ML
SYRINGE (ML) INTRAVENOUS
Status: DISPENSED
Start: 2021-11-14

## (undated) RX ORDER — VANCOMYCIN HYDROCHLORIDE 1 G/20ML
INJECTION, POWDER, LYOPHILIZED, FOR SOLUTION INTRAVENOUS
Status: DISPENSED
Start: 2021-11-14

## (undated) RX ORDER — ONDANSETRON 2 MG/ML
INJECTION INTRAMUSCULAR; INTRAVENOUS
Status: DISPENSED
Start: 2021-11-14